# Patient Record
Sex: FEMALE | Race: WHITE | NOT HISPANIC OR LATINO | URBAN - METROPOLITAN AREA
[De-identification: names, ages, dates, MRNs, and addresses within clinical notes are randomized per-mention and may not be internally consistent; named-entity substitution may affect disease eponyms.]

---

## 2018-07-13 LAB
BASOPHILS # BLD AUTO: 0 X10E3/UL (ref 0–0.2)
BASOPHILS NFR BLD AUTO: 0 %
EOSINOPHIL # BLD AUTO: 0.3 X10E3/UL (ref 0–0.4)
EOSINOPHIL NFR BLD AUTO: 3 %
ERYTHROCYTE [DISTWIDTH] IN BLOOD BY AUTOMATED COUNT: 15 % (ref 12.3–15.4)
HCT VFR BLD AUTO: 43 % (ref 34–46.6)
HGB BLD-MCNC: 13.9 G/DL (ref 11.1–15.9)
IMM GRANULOCYTES # BLD: 0 X10E3/UL (ref 0–0.1)
IMM GRANULOCYTES NFR BLD: 0 %
LABCORP AMBIG ABBREV: NORMAL
LABCORP AMBIG ABBREV: NORMAL
LYMPHOCYTES # BLD AUTO: 1.9 X10E3/UL (ref 0.7–3.1)
LYMPHOCYTES NFR BLD AUTO: 23 %
MCH RBC QN AUTO: 30.2 PG (ref 26.6–33)
MCHC RBC AUTO-ENTMCNC: 32.3 G/DL (ref 31.5–35.7)
MCV RBC AUTO: 93 FL (ref 79–97)
MONOCYTES # BLD AUTO: 0.7 X10E3/UL (ref 0.1–0.9)
MONOCYTES NFR BLD AUTO: 8 %
NEUTROPHILS # BLD AUTO: 5.2 X10E3/UL (ref 1.4–7)
NEUTROPHILS NFR BLD AUTO: 66 %
PLATELET # BLD AUTO: 354 X10E3/UL (ref 150–379)
RBC # BLD AUTO: 4.61 X10E6/UL (ref 3.77–5.28)
WBC # BLD AUTO: 8 X10E3/UL (ref 3.4–10.8)

## 2018-07-14 LAB
ALT SERPL-CCNC: 14 IU/L (ref 0–32)
AST SERPL-CCNC: 15 IU/L (ref 0–40)
BUN SERPL-MCNC: 24 MG/DL (ref 8–27)
BUN/CREAT SERPL: 24 (ref 12–28)
CALCIUM SERPL-MCNC: 9.3 MG/DL (ref 8.7–10.3)
CHLORIDE SERPL-SCNC: 104 MMOL/L (ref 96–106)
CHOLEST SERPL-MCNC: 185 MG/DL (ref 100–199)
CO2 SERPL-SCNC: 24 MMOL/L (ref 20–29)
CREAT SERPL-MCNC: 1.02 MG/DL (ref 0.57–1)
GLUCOSE SERPL-MCNC: 84 MG/DL (ref 65–99)
HDLC SERPL-MCNC: 55 MG/DL
LAB CORP EGFR IF AFRICN AM: 57 ML/MIN/1.73
LAB CORP EGFR IF NONAFRICN AM: 49 ML/MIN/1.73
LDLC SERPL CALC-MCNC: 101 MG/DL (ref 0–99)
POTASSIUM SERPL-SCNC: 4.5 MMOL/L (ref 3.5–5.2)
SODIUM SERPL-SCNC: 144 MMOL/L (ref 134–144)
TRIGL SERPL-MCNC: 147 MG/DL (ref 0–149)
VLDLC SERPL CALC-MCNC: 29 MG/DL (ref 5–40)

## 2018-10-01 ENCOUNTER — APPOINTMENT (OUTPATIENT)
Dept: URBAN - METROPOLITAN AREA CLINIC 200 | Age: 84
Setting detail: DERMATOLOGY
End: 2018-10-01

## 2018-10-01 ENCOUNTER — RX ONLY (RX ONLY)
Age: 84
End: 2018-10-01

## 2018-10-01 DIAGNOSIS — D485 NEOPLASM OF UNCERTAIN BEHAVIOR OF SKIN: ICD-10-CM

## 2018-10-01 DIAGNOSIS — L57.8 OTHER SKIN CHANGES DUE TO CHRONIC EXPOSURE TO NONIONIZING RADIATION: ICD-10-CM

## 2018-10-01 DIAGNOSIS — L57.0 ACTINIC KERATOSIS: ICD-10-CM

## 2018-10-01 PROBLEM — I10 ESSENTIAL (PRIMARY) HYPERTENSION: Status: ACTIVE | Noted: 2018-10-01

## 2018-10-01 PROBLEM — D48.5 NEOPLASM OF UNCERTAIN BEHAVIOR OF SKIN: Status: ACTIVE | Noted: 2018-10-01

## 2018-10-01 PROCEDURE — 17000 DESTRUCT PREMALG LESION: CPT

## 2018-10-01 PROCEDURE — OTHER COUNSELING: OTHER

## 2018-10-01 PROCEDURE — OTHER MIPS QUALITY: OTHER

## 2018-10-01 PROCEDURE — 99213 OFFICE O/P EST LOW 20 MIN: CPT | Mod: 25

## 2018-10-01 PROCEDURE — 11100: CPT | Mod: 59

## 2018-10-01 PROCEDURE — OTHER LIQUID NITROGEN: OTHER

## 2018-10-01 PROCEDURE — OTHER BIOPSY BY SHAVE METHOD: OTHER

## 2018-10-01 RX ORDER — TRETINOIN 0.25 MG/G
CREAM TOPICAL
Qty: 1 | Refills: 11 | Status: ERX

## 2018-10-01 ASSESSMENT — LOCATION ZONE DERM
LOCATION ZONE: ARM
LOCATION ZONE: TRUNK

## 2018-10-01 ASSESSMENT — PAIN INTENSITY VAS: HOW INTENSE IS YOUR PAIN 0 BEING NO PAIN, 10 BEING THE MOST SEVERE PAIN POSSIBLE?: NO PAIN

## 2018-10-01 ASSESSMENT — LOCATION DETAILED DESCRIPTION DERM
LOCATION DETAILED: LEFT LATERAL SUPERIOR CHEST
LOCATION DETAILED: LEFT MEDIAL SUPERIOR CHEST
LOCATION DETAILED: LEFT DISTAL DORSAL FOREARM

## 2018-10-01 ASSESSMENT — LOCATION SIMPLE DESCRIPTION DERM
LOCATION SIMPLE: LEFT FOREARM
LOCATION SIMPLE: CHEST

## 2018-10-01 NOTE — PROCEDURE: BIOPSY BY SHAVE METHOD
Post-Care Instructions: I reviewed with the patient in detail post-care instructions. Patient is to keep the biopsy site dry overnight, and then apply bacitracin twice daily until healed. Patient may apply hydrogen peroxide soaks to remove any crusting.
Consent: Written consent was obtained and risks were reviewed including but not limited to scarring, infection, bleeding, scabbing, incomplete removal, nerve damage and allergy to anesthesia.
Detail Level: Detailed
Depth Of Biopsy: dermis
Anesthesia Volume In Cc (Will Not Render If 0): 0.5
Dressing: bandage
Anesthesia Type: 0.5% lidocaine without epinephrine
X Size Of Lesion In Cm: 0
Biopsy Method: Dermablade
Billing Type: Third-Party Bill
Size Of Lesion In Cm: 0.8
Destruction After The Procedure: No
Electrodesiccation Text: The wound bed was treated with electrodesiccation after the biopsy was performed.
Wound Care: Petrolatum
Type Of Destruction Used: Curettage
Silver Nitrate Text: The wound bed was treated with silver nitrate after the biopsy was performed.
Hemostasis: Drysol
Notification Instructions: Patient will be notified of biopsy results. However, patient instructed to call the office if not contacted within 2 weeks.
Cryotherapy Text: The wound bed was treated with cryotherapy after the biopsy was performed.
Electrodesiccation And Curettage Text: The wound bed was treated with electrodesiccation and curettage after the biopsy was performed.
Curettage Text: The wound bed was treated with curettage after the biopsy was performed.
Was A Bandage Applied: Yes
Biopsy Type: H and E

## 2019-04-12 ENCOUNTER — APPOINTMENT (OUTPATIENT)
Dept: LAB | Age: 84
End: 2019-04-12
Attending: INTERNAL MEDICINE
Payer: MEDICARE

## 2019-04-12 ENCOUNTER — TRANSCRIBE ORDERS (OUTPATIENT)
Dept: LAB | Age: 84
End: 2019-04-12

## 2019-04-12 DIAGNOSIS — E78.5 HYPERLIPIDEMIA: ICD-10-CM

## 2019-04-12 DIAGNOSIS — R06.09 OTHER FORMS OF DYSPNEA: ICD-10-CM

## 2019-04-12 DIAGNOSIS — R00.2 PALPITATIONS: ICD-10-CM

## 2019-04-12 DIAGNOSIS — I25.10 ATHEROSCLEROTIC HEART DISEASE OF NATIVE CORONARY ARTERY WITHOUT ANGINA PECTORIS: ICD-10-CM

## 2019-04-12 DIAGNOSIS — I25.10 ATHEROSCLEROTIC HEART DISEASE OF NATIVE CORONARY ARTERY WITHOUT ANGINA PECTORIS: Primary | ICD-10-CM

## 2019-04-12 LAB
ALBUMIN SERPL-MCNC: 3.5 G/DL (ref 3.4–5)
ALP SERPL-CCNC: 86 IU/L (ref 35–126)
ALT SERPL-CCNC: 17 IU/L (ref 11–54)
AST SERPL-CCNC: 18 IU/L (ref 15–41)
BILIRUB DIRECT SERPL-MCNC: 0.2 MG/DL
BILIRUB SERPL-MCNC: 1 MG/DL (ref 0.3–1.2)
CHOLEST SERPL-MCNC: 200 MG/DL
HDLC SERPL-MCNC: 49 MG/DL
HDLC SERPL: 4.1 {RATIO}
LDLC SERPL CALC-MCNC: 123 MG/DL
NONHDLC SERPL-MCNC: 151 MG/DL
PROT SERPL-MCNC: 5.8 G/DL (ref 6–8.2)
TRIGL SERPL-MCNC: 140 MG/DL (ref 30–149)

## 2019-04-12 PROCEDURE — 36415 COLL VENOUS BLD VENIPUNCTURE: CPT

## 2019-04-12 PROCEDURE — 80076 HEPATIC FUNCTION PANEL: CPT

## 2019-04-12 PROCEDURE — 80061 LIPID PANEL: CPT

## 2019-05-03 ENCOUNTER — OFFICE VISIT (OUTPATIENT)
Dept: PRIMARY CARE | Facility: CLINIC | Age: 84
End: 2019-05-03
Payer: MEDICARE

## 2019-05-03 VITALS
DIASTOLIC BLOOD PRESSURE: 62 MMHG | OXYGEN SATURATION: 98 % | TEMPERATURE: 98.4 F | HEIGHT: 65 IN | SYSTOLIC BLOOD PRESSURE: 128 MMHG | HEART RATE: 63 BPM | RESPIRATION RATE: 14 BRPM | WEIGHT: 172.8 LBS | BODY MASS INDEX: 28.79 KG/M2

## 2019-05-03 DIAGNOSIS — M17.12 PRIMARY OSTEOARTHRITIS OF LEFT KNEE: ICD-10-CM

## 2019-05-03 DIAGNOSIS — M17.0 TRICOMPARTMENT OSTEOARTHRITIS OF BOTH KNEES: Primary | ICD-10-CM

## 2019-05-03 DIAGNOSIS — M17.11 PRIMARY OSTEOARTHRITIS OF RIGHT KNEE: ICD-10-CM

## 2019-05-03 PROBLEM — M17.9 DJD (DEGENERATIVE JOINT DISEASE) OF KNEE: Status: ACTIVE | Noted: 2017-12-11

## 2019-05-03 PROCEDURE — 99213 OFFICE O/P EST LOW 20 MIN: CPT | Mod: 25 | Performed by: INTERNAL MEDICINE

## 2019-05-03 PROCEDURE — 20610 DRAIN/INJ JOINT/BURSA W/O US: CPT | Mod: 50 | Performed by: INTERNAL MEDICINE

## 2019-05-03 RX ORDER — METOPROLOL TARTRATE 50 MG/1
50 TABLET ORAL
COMMUNITY
End: 2020-01-02

## 2019-05-03 RX ORDER — ATORVASTATIN CALCIUM 10 MG/1
10 TABLET, FILM COATED ORAL
Refills: 3 | COMMUNITY
Start: 2019-03-26 | End: 2020-01-24

## 2019-05-03 RX ORDER — METHYLPREDNISOLONE ACETATE 40 MG/ML
40 INJECTION, SUSPENSION INTRA-ARTICULAR; INTRALESIONAL; INTRAMUSCULAR; SOFT TISSUE ONCE
Status: COMPLETED | OUTPATIENT
Start: 2019-05-03 | End: 2019-05-03

## 2019-05-03 RX ORDER — ASPIRIN 81 MG/1
81 TABLET ORAL DAILY
COMMUNITY
Start: 2017-12-22 | End: 2020-01-24

## 2019-05-03 RX ADMIN — METHYLPREDNISOLONE ACETATE 40 MG: 40 INJECTION, SUSPENSION INTRA-ARTICULAR; INTRALESIONAL; INTRAMUSCULAR; SOFT TISSUE at 16:26

## 2019-05-03 RX ADMIN — METHYLPREDNISOLONE ACETATE 40 MG: 40 INJECTION, SUSPENSION INTRA-ARTICULAR; INTRALESIONAL; INTRAMUSCULAR; SOFT TISSUE at 16:25

## 2019-05-03 ASSESSMENT — ENCOUNTER SYMPTOMS
MYALGIAS: 0
ARTHRALGIAS: 1
FEVER: 0
JOINT SWELLING: 0
NUMBNESS: 0

## 2019-05-03 NOTE — PROCEDURES
Joint Aspiration/Injection  Date/Time: 5/3/2019 4:23 PM  Performed by: YOGI POLANCO  Authorized by: YOGI POLANCO     Consent:     Consent obtained:  Verbal    Consent given by:  Patient  Location:     Location:  Knee    Knee:  R knee  Anesthesia (see MAR for exact dosages):     Anesthesia method:  Local infiltration    Local anesthetic:  Lidocaine 1% w/o epi  Procedure details:     Preparation: Patient was prepped and draped in usual sterile fashion      Needle gauge:  22 G    Ultrasound guidance: no      Approach:  Lateral    Steroid injected: yes      Specimen collected: no    Post-procedure details:     Dressing:  Adhesive bandage    Patient tolerance of procedure:  Tolerated well, no immediate complications

## 2019-05-03 NOTE — PROCEDURES
Joint Aspiration/Injection  Date/Time: 5/3/2019 4:20 PM  Performed by: YOGI POLANCO  Authorized by: YOGI POLANCO     Consent:     Consent obtained:  Verbal    Consent given by:  Patient    Alternatives discussed:  Alternative treatment  Location:     Location:  Knee    Knee:  L knee  Anesthesia (see MAR for exact dosages):     Anesthesia method:  Local infiltration    Local anesthetic:  Lidocaine 1% w/o epi  Procedure details:     Preparation: Patient was prepped and draped in usual sterile fashion      Needle gauge:  22 G    Ultrasound guidance: no      Approach:  Lateral    Steroid injected: yes      Specimen collected: no    Post-procedure details:     Dressing:  Adhesive bandage    Patient tolerance of procedure:  Tolerated well, no immediate complications

## 2019-05-03 NOTE — PROGRESS NOTES
Subjective      Patient ID: Gali Escalera is a 89 y.o. female.    HPI  Here for bilateral knee pain, left more than right.  History of ongoing degenerative arthritis of both knees, has had steroid injections in both in the past.  Is here to have steroid injection.  She saw ortho who told she needs knee replacements, but she is not interested in this.     + hyperlipidemia. controlled on Atorvastatin.  +leg edema..uses HCTZ rarely.  +palpitations and non critical occlusion at proximal LAD,, ejection fraction 55% on cath 2014; on Ranexa and  Metoprolol. . Has had no chest pain, palpitations, or syncope. Dr. Yeboah,, last appt Sept 2017. condition stable.  + degenerative arthritis, in both knees...  +fracture left hip July 2017, due to a fall. Had left hip pinning , then replacement Dec 2017.     The following have been reviewed and updated as appropriate in this visit:       Review of Systems   Constitutional: Negative for fever.   Cardiovascular: Negative for leg swelling.   Musculoskeletal: Positive for arthralgias and gait problem. Negative for joint swelling and myalgias.        See history of present illness   Neurological: Negative for numbness.     Current Outpatient Prescriptions   Medication Sig Dispense Refill   • aspirin 81 mg enteric coated tablet Take 81 mg by mouth 2 times daily.     • atorvastatin (LIPITOR) 10 mg tablet Take 10 mg by mouth once daily.  3   • metoprolol tartrate (LOPRESSOR) 50 mg tablet 50 mg.       No current facility-administered medications for this visit.      No past medical history on file.  No family history on file.  Allergies   Allergen Reactions   • No Known Allergies      No past surgical history on file.  Social History     Social History   • Marital status:      Spouse name: N/A   • Number of children: N/A   • Years of education: N/A     Social History Main Topics   • Smoking status: Not on file   • Smokeless tobacco: Not on file   • Alcohol use Not on file   • Drug  "use: Unknown   • Sexual activity: Not on file     Other Topics Concern   • Not on file     Social History Narrative   • No narrative on file       Objective     Vitals:   Vitals:    05/03/19 1043   BP: 128/62   BP Location: Left upper arm   Patient Position: Sitting   Pulse: 63   Resp: 14   Temp: 36.9 °C (98.4 °F)   SpO2: 98%   Weight: 78.4 kg (172 lb 12.8 oz)   Height: 1.651 m (5' 5\")       Physical Exam   Constitutional: She appears well-nourished. No distress.   Musculoskeletal: She exhibits no edema.   Significant degenerative changes of both knees, crepitus with flexion, cannot fully flex either knee secondary to arthritis.  No joint effusion.   Neurological: No sensory deficit.   Psychiatric: She has a normal mood and affect.   Nursing note and vitals reviewed.      Labs  Lab Results   Component Value Date    WBC 8.0 07/13/2018    HGB 13.9 07/13/2018    HCT 43.0 07/13/2018     07/13/2018    CHOL 200 04/12/2019    TRIG 140 04/12/2019    HDL 49 (L) 04/12/2019    LDLCALC 123 (H) 04/12/2019    ALT 17 04/12/2019    AST 18 04/12/2019     07/13/2018    K 4.5 07/13/2018     07/13/2018    CREATININE 1.02 (H) 07/13/2018    BUN 24 07/13/2018    CO2 24 07/13/2018    TSH 3.45 07/18/2016       Assessment/Plan   Problem List Items Addressed This Visit     None      Visit Diagnoses     Tricompartment osteoarthritis of both knees    -  Primary        Under sterile conditions injected both knees with 1 cc plain lidocaine and 40 mg of methylprednisolone.  Patient tolerated well, no complications.  She will follow up as needed.  Timmy Archibald MD  5/3/2019    "

## 2019-10-04 ENCOUNTER — OFFICE VISIT (OUTPATIENT)
Dept: PRIMARY CARE | Facility: CLINIC | Age: 84
End: 2019-10-04
Payer: MEDICARE

## 2019-10-04 VITALS
DIASTOLIC BLOOD PRESSURE: 70 MMHG | RESPIRATION RATE: 18 BRPM | WEIGHT: 174.8 LBS | HEART RATE: 59 BPM | BODY MASS INDEX: 29.09 KG/M2 | TEMPERATURE: 98.7 F | OXYGEN SATURATION: 97 % | SYSTOLIC BLOOD PRESSURE: 130 MMHG

## 2019-10-04 DIAGNOSIS — Z23 INFLUENZA VACCINE NEEDED: ICD-10-CM

## 2019-10-04 DIAGNOSIS — S83.92XA SPRAIN OF LEFT LOWER LEG, INITIAL ENCOUNTER: Primary | ICD-10-CM

## 2019-10-04 PROCEDURE — G0008 ADMIN INFLUENZA VIRUS VAC: HCPCS | Performed by: INTERNAL MEDICINE

## 2019-10-04 PROCEDURE — 90653 IIV ADJUVANT VACCINE IM: CPT | Performed by: INTERNAL MEDICINE

## 2019-10-04 PROCEDURE — 99213 OFFICE O/P EST LOW 20 MIN: CPT | Mod: 25 | Performed by: INTERNAL MEDICINE

## 2019-10-04 RX ORDER — HYDROCHLOROTHIAZIDE 12.5 MG/1
12.5 TABLET ORAL DAILY PRN
Refills: 3 | COMMUNITY
Start: 2019-07-28 | End: 2020-01-02

## 2019-10-04 ASSESSMENT — ENCOUNTER SYMPTOMS
APPETITE CHANGE: 0
ACTIVITY CHANGE: 0
ARTHRALGIAS: 1
NUMBNESS: 0
SHORTNESS OF BREATH: 1
WEAKNESS: 0

## 2019-10-04 NOTE — PROGRESS NOTES
Subjective      Patient ID: Gali Escalera is a 89 y.o. female.    HPI  Here for discomfort in rt lower leg for a year or two. . No injury or swelling.  Pain on and off, not consistently with ambulation.   Had Rt hip fx Jan 2018. Says rt lower leg began hurting after this.   Uses topical creams and advil with some temporary improvement.     Saw cardiologist this week. He ordered PFT's as pt has chronic LIMON. Cardiac status stable.     The following have been reviewed and updated as appropriate in this visit:       Review of Systems   Constitutional: Negative for activity change and appetite change.   Respiratory: Positive for shortness of breath (chronic, limon).    Musculoskeletal: Positive for arthralgias and gait problem.        See hpi   Neurological: Negative for weakness and numbness.     Current Outpatient Prescriptions   Medication Sig Dispense Refill   • aspirin 81 mg enteric coated tablet Take 81 mg by mouth 2 times daily.     • atorvastatin (LIPITOR) 10 mg tablet Take 10 mg by mouth once daily.  3   • hydrochlorothiazide (HYDRODIURIL) 12.5 mg tablet Take 12.5 mg by mouth daily as needed.  3   • metoprolol tartrate (LOPRESSOR) 50 mg tablet 50 mg.       No current facility-administered medications for this visit.      No past medical history on file.  No family history on file.  Allergies   Allergen Reactions   • No Known Allergies      No past surgical history on file.  Social History     Social History   • Marital status:      Spouse name: N/A   • Number of children: N/A   • Years of education: N/A     Social History Main Topics   • Smoking status: Never Smoker   • Smokeless tobacco: Never Used   • Alcohol use None   • Drug use: Unknown   • Sexual activity: Not Asked     Other Topics Concern   • None     Social History Narrative   • None       Objective     Vitals:   Vitals:    10/04/19 1217   BP: 130/70   Pulse: (!) 59   Resp: 18   Temp: 37.1 °C (98.7 °F)   SpO2: 97%   Weight: 79.3 kg (174 lb 12.8  oz)       Physical Exam   Constitutional: She appears well-nourished. No distress.   Cardiovascular:   Pulses:       Dorsalis pedis pulses are 2+ on the right side, and 2+ on the left side.   Musculoskeletal:   Right lower leg appears normal.  There is no swelling, ecchymosis, erythema.  She points to the gastrocnemius muscle where her discomfort is located.  No palpable abnormality of muscle.  Normal dorsiflexion and plantarflexion of foot  without discomfort in calf.  No tenderness along Achilles.  Had patient walk, no discomfort with ambulation.   Nursing note and vitals reviewed.      Labs  Lab Results   Component Value Date    WBC 8.0 07/13/2018    HGB 13.9 07/13/2018    HCT 43.0 07/13/2018     07/13/2018    CHOL 200 04/12/2019    TRIG 140 04/12/2019    HDL 49 (L) 04/12/2019    LDLCALC 123 (H) 04/12/2019    ALT 17 04/12/2019    AST 18 04/12/2019     07/13/2018    K 4.5 07/13/2018     07/13/2018    CREATININE 1.02 (H) 07/13/2018    BUN 24 07/13/2018    CO2 24 07/13/2018    TSH 3.45 07/18/2016       Assessment/Plan   Problem List Items Addressed This Visit     None      Visit Diagnoses     Sprain of left lower leg, initial encounter    -  Primary    Relevant Orders    Ambulatory referral to Physical Therapy        Exam is not revealing,but likely is muscular. Send to PT.   Timmy Archibald MD  10/4/2019

## 2019-10-23 ENCOUNTER — TRANSCRIBE ORDERS (OUTPATIENT)
Dept: SCHEDULING | Age: 84
End: 2019-10-23

## 2019-10-25 ENCOUNTER — RX ONLY (RX ONLY)
Age: 85
End: 2019-10-25

## 2019-10-25 ENCOUNTER — APPOINTMENT (OUTPATIENT)
Dept: URBAN - METROPOLITAN AREA CLINIC 200 | Age: 85
Setting detail: DERMATOLOGY
End: 2019-10-29

## 2019-10-25 DIAGNOSIS — L57.8 OTHER SKIN CHANGES DUE TO CHRONIC EXPOSURE TO NONIONIZING RADIATION: ICD-10-CM

## 2019-10-25 DIAGNOSIS — L57.0 ACTINIC KERATOSIS: ICD-10-CM

## 2019-10-25 PROCEDURE — 17003 DESTRUCT PREMALG LES 2-14: CPT

## 2019-10-25 PROCEDURE — OTHER MIPS QUALITY: OTHER

## 2019-10-25 PROCEDURE — OTHER LIQUID NITROGEN: OTHER

## 2019-10-25 PROCEDURE — 17000 DESTRUCT PREMALG LESION: CPT

## 2019-10-25 PROCEDURE — OTHER COUNSELING: OTHER

## 2019-10-25 PROCEDURE — 99213 OFFICE O/P EST LOW 20 MIN: CPT | Mod: 25

## 2019-10-25 RX ORDER — TRETINOIN 0.25 MG/G
CREAM TOPICAL
Qty: 1 | Refills: 11 | Status: ERX | COMMUNITY
Start: 2019-10-25

## 2019-10-25 ASSESSMENT — LOCATION SIMPLE DESCRIPTION DERM
LOCATION SIMPLE: RIGHT CLAVICULAR SKIN
LOCATION SIMPLE: RIGHT ANTERIOR NECK
LOCATION SIMPLE: LEFT ANTERIOR NECK
LOCATION SIMPLE: CHEST
LOCATION SIMPLE: RIGHT SHOULDER

## 2019-10-25 ASSESSMENT — LOCATION DETAILED DESCRIPTION DERM
LOCATION DETAILED: LEFT CLAVICULAR NECK
LOCATION DETAILED: RIGHT ANTERIOR SHOULDER
LOCATION DETAILED: LEFT MEDIAL SUPERIOR CHEST
LOCATION DETAILED: RIGHT CLAVICULAR NECK
LOCATION DETAILED: RIGHT CLAVICULAR SKIN

## 2019-10-25 ASSESSMENT — LOCATION ZONE DERM
LOCATION ZONE: TRUNK
LOCATION ZONE: NECK
LOCATION ZONE: ARM

## 2019-10-25 NOTE — PROCEDURE: MIPS QUALITY
Quality 110: Preventive Care And Screening: Influenza Immunization: Influenza Immunization previously received during influenza season
Additional Notes: Shingles SHINGRIX vaccine not received due to it being on back order.
Quality 474: Zoster Vaccination Status: Shingrix vaccine was not administered for reasons documented by clinician (e.g. patient administered vaccine other than Shingrix, patient allergy or other medical reasons, patient declined or other patient reasons, vaccine not available or other system reasons)
Detail Level: Detailed

## 2019-10-25 NOTE — PROCEDURE: LIQUID NITROGEN
Render Note In Bullet Format When Appropriate: No
Number Of Freeze-Thaw Cycles: 1 freeze-thaw cycle
Detail Level: Detailed
Duration Of Freeze Thaw-Cycle (Seconds): 2
Consent: The patient's consent was obtained including but not limited to risks of crusting, scabbing, blistering, scarring, darker or lighter pigmentary change, recurrence, incomplete removal and infection.
Post-Care Instructions: I reviewed with the patient in detail post-care instructions. Patient is to wear sunprotection, and avoid picking at any of the treated lesions. Pt may apply Vaseline to crusted or scabbing areas.

## 2019-11-22 ENCOUNTER — HOSPITAL ENCOUNTER (OUTPATIENT)
Facility: CLINIC | Age: 84
Discharge: ANOTHER HOSPITAL | End: 2019-11-22
Attending: EMERGENCY MEDICINE
Payer: MEDICARE

## 2019-11-22 VITALS
HEART RATE: 89 BPM | HEIGHT: 65 IN | DIASTOLIC BLOOD PRESSURE: 80 MMHG | SYSTOLIC BLOOD PRESSURE: 138 MMHG | OXYGEN SATURATION: 96 % | TEMPERATURE: 98.7 F | BODY MASS INDEX: 27.49 KG/M2 | WEIGHT: 165 LBS

## 2019-11-22 DIAGNOSIS — R11.10 NON-INTRACTABLE VOMITING, PRESENCE OF NAUSEA NOT SPECIFIED, UNSPECIFIED VOMITING TYPE: Primary | ICD-10-CM

## 2019-11-22 DIAGNOSIS — R19.7 DIARRHEA, UNSPECIFIED TYPE: ICD-10-CM

## 2019-11-22 LAB
RAPID INFLUENZA A AGN: NEGATIVE
RAPID INFLUENZA B AGN: NEGATIVE

## 2019-11-22 PROCEDURE — 87804 INFLUENZA ASSAY W/OPTIC: CPT | Mod: QW | Performed by: EMERGENCY MEDICINE

## 2019-11-22 PROCEDURE — 99213 OFFICE O/P EST LOW 20 MIN: CPT | Performed by: EMERGENCY MEDICINE

## 2019-11-22 RX ORDER — TRETINOIN 0.25 MG/G
CREAM TOPICAL
Refills: 11 | COMMUNITY
Start: 2019-10-29 | End: 2023-05-04 | Stop reason: ALTCHOICE

## 2019-11-22 ASSESSMENT — ENCOUNTER SYMPTOMS
COUGH: 0
DIARRHEA: 1
FATIGUE: 1
ABDOMINAL PAIN: 1
APPETITE CHANGE: 1
BRUISES/BLEEDS EASILY: 0
CHILLS: 1
ADENOPATHY: 0
VOMITING: 1
TROUBLE SWALLOWING: 0
FEVER: 0
SHORTNESS OF BREATH: 0
RHINORRHEA: 0
VOICE CHANGE: 0
NAUSEA: 1

## 2019-11-22 NOTE — ED PROVIDER NOTES
History  Chief Complaint   Patient presents with   • Generalized Body Aches   • Diarrhea (5am)     Pt started with chills, myalgias last pm  Started with diarrhea 500am, nonbloody  Anorexia/abd pain  Nasal congestion started this am    NO fevers  NO cough      History provided by:  Patient   used: No        No past medical history on file.    No past surgical history on file.    No family history on file.    Social History     Tobacco Use   • Smoking status: Never Smoker   • Smokeless tobacco: Never Used   Substance Use Topics   • Alcohol use: Not on file   • Drug use: Not on file       Review of Systems   Constitutional: Positive for appetite change, chills and fatigue. Negative for fever.   HENT: Negative for nosebleeds, rhinorrhea, trouble swallowing and voice change.    Respiratory: Negative for cough and shortness of breath.    Cardiovascular: Negative for chest pain.   Gastrointestinal: Positive for abdominal pain, diarrhea, nausea and vomiting.   Allergic/Immunologic: Negative for immunocompromised state.   Hematological: Negative for adenopathy. Does not bruise/bleed easily.       Physical Exam  ED Triage Vitals [11/22/19 1150]   Temp Heart Rate Resp BP SpO2   37.1 °C (98.7 °F) 89 -- 138/80 96 %      Temp Source Heart Rate Source Patient Position BP Location FiO2 (%) (Set)   Oral -- Sitting Left upper arm --       Physical Exam   Constitutional: She is oriented to person, place, and time. She appears well-developed and well-nourished.   HENT:   Head: Normocephalic and atraumatic.   Right Ear: External ear normal.   Left Ear: External ear normal.   Nose: Nose normal.   Mouth/Throat: Oropharynx is clear and moist.   Eyes: Pupils are equal, round, and reactive to light. Conjunctivae and EOM are normal.   Neck: Normal range of motion. Neck supple.   Cardiovascular: Normal rate, regular rhythm, normal heart sounds and intact distal pulses.   Pulmonary/Chest: Effort normal and breath sounds  normal.   Abdominal: Soft. Bowel sounds are normal. She exhibits no mass. There is tenderness. There is no guarding.   Tenderness LLQ   Musculoskeletal: Normal range of motion.   Neurological: She is alert and oriented to person, place, and time.   Skin: Skin is warm and dry. Capillary refill takes less than 2 seconds.         Procedures  Procedures    UC Course  Clinical Impressions as of Nov 22 1214   Non-intractable vomiting, presence of nausea not specified, unspecified vomiting type   Diarrhea, unspecified type       MDM  Number of Diagnoses or Management Options  Diarrhea, unspecified type:   Non-intractable vomiting, presence of nausea not specified, unspecified vomiting type:   Diagnosis management comments: Pt has billious emesis in UC  Rec she be evaluated in ED- IVF, labs, antiemetics  Grandson taking her to [facility]- staff notified.                 Chen Troncoso,   11/22/19 5449

## 2019-11-27 ENCOUNTER — TELEPHONE (OUTPATIENT)
Dept: PRIMARY CARE | Facility: CLINIC | Age: 84
End: 2019-11-27

## 2019-11-27 NOTE — TELEPHONE ENCOUNTER
Eve from [facility] calling to let you know patient is being d/c and wanted to see if you will sign for her home care nursing and PT orders. Requesting confirmation call back at [phone]

## 2020-01-02 ENCOUNTER — OFFICE VISIT (OUTPATIENT)
Dept: PRIMARY CARE | Facility: CLINIC | Age: 85
End: 2020-01-02
Payer: MEDICARE

## 2020-01-02 ENCOUNTER — APPOINTMENT (OUTPATIENT)
Dept: LAB | Age: 85
End: 2020-01-02
Attending: INTERNAL MEDICINE
Payer: MEDICARE

## 2020-01-02 VITALS
RESPIRATION RATE: 16 BRPM | DIASTOLIC BLOOD PRESSURE: 62 MMHG | HEIGHT: 65 IN | HEART RATE: 70 BPM | WEIGHT: 164 LBS | TEMPERATURE: 98.5 F | SYSTOLIC BLOOD PRESSURE: 140 MMHG | BODY MASS INDEX: 27.32 KG/M2 | OXYGEN SATURATION: 97 %

## 2020-01-02 DIAGNOSIS — E78.2 MIXED HYPERLIPIDEMIA: ICD-10-CM

## 2020-01-02 DIAGNOSIS — R39.89 URINE DISCOLORATION: ICD-10-CM

## 2020-01-02 DIAGNOSIS — N20.0 RENAL CALCULI: ICD-10-CM

## 2020-01-02 DIAGNOSIS — I48.0 PAROXYSMAL A-FIB (CMS/HCC): ICD-10-CM

## 2020-01-02 DIAGNOSIS — I48.0 PAROXYSMAL A-FIB (CMS/HCC): Primary | ICD-10-CM

## 2020-01-02 LAB
ALT SERPL-CCNC: 18 IU/L (ref 11–54)
ANION GAP SERPL CALC-SCNC: 10 MEQ/L (ref 3–15)
AST SERPL-CCNC: 18 IU/L (ref 15–41)
BASOPHILS # BLD: 0.06 K/UL (ref 0.01–0.1)
BASOPHILS NFR BLD: 0.6 %
BUN SERPL-MCNC: 18 MG/DL (ref 8–20)
CALCIUM SERPL-MCNC: 9.4 MG/DL (ref 8.9–10.3)
CHLORIDE SERPL-SCNC: 108 MEQ/L (ref 98–109)
CHOLEST SERPL-MCNC: 170 MG/DL
CO2 SERPL-SCNC: 23 MEQ/L (ref 22–32)
CREAT SERPL-MCNC: 1.1 MG/DL
DIFFERENTIAL METHOD BLD: ABNORMAL
EOSINOPHIL # BLD: 0.33 K/UL (ref 0.04–0.36)
EOSINOPHIL NFR BLD: 3.5 %
ERYTHROCYTE [DISTWIDTH] IN BLOOD BY AUTOMATED COUNT: 14.9 % (ref 11.7–14.4)
GFR SERPL CREATININE-BSD FRML MDRD: 46.8 ML/MIN/1.73M*2
GLUCOSE SERPL-MCNC: 83 MG/DL (ref 70–99)
HCT VFR BLDCO AUTO: 45.2 %
HDLC SERPL-MCNC: 40 MG/DL
HDLC SERPL: 4.3 {RATIO}
HGB BLD-MCNC: 13.9 G/DL (ref 11.8–15.7)
IMM GRANULOCYTES # BLD AUTO: 0.04 K/UL (ref 0–0.08)
IMM GRANULOCYTES NFR BLD AUTO: 0.4 %
LDLC SERPL CALC-MCNC: 96 MG/DL
LYMPHOCYTES # BLD: 1.9 K/UL (ref 1.2–3.5)
LYMPHOCYTES NFR BLD: 20.1 %
MCH RBC QN AUTO: 29.3 PG (ref 28–33.2)
MCHC RBC AUTO-ENTMCNC: 30.8 G/DL (ref 32.2–35.5)
MCV RBC AUTO: 95.2 FL (ref 83–98)
MONOCYTES # BLD: 0.97 K/UL (ref 0.28–0.8)
MONOCYTES NFR BLD: 10.3 %
NEUTROPHILS # BLD: 6.15 K/UL (ref 1.7–7)
NEUTS SEG NFR BLD: 65.1 %
NONHDLC SERPL-MCNC: 130 MG/DL
NRBC BLD-RTO: 0 %
PDW BLD AUTO: 11.4 FL (ref 9.4–12.3)
PLATELET # BLD AUTO: 439 K/UL
POTASSIUM SERPL-SCNC: 4.5 MEQ/L (ref 3.6–5.1)
RBC # BLD AUTO: 4.75 M/UL (ref 3.93–5.22)
SODIUM SERPL-SCNC: 141 MEQ/L (ref 136–144)
TRIGL SERPL-MCNC: 170 MG/DL (ref 30–149)
WBC # BLD AUTO: 9.45 K/UL

## 2020-01-02 PROCEDURE — 99214 OFFICE O/P EST MOD 30 MIN: CPT | Performed by: INTERNAL MEDICINE

## 2020-01-02 PROCEDURE — 84460 ALANINE AMINO (ALT) (SGPT): CPT

## 2020-01-02 PROCEDURE — 85025 COMPLETE CBC W/AUTO DIFF WBC: CPT

## 2020-01-02 PROCEDURE — 80048 BASIC METABOLIC PNL TOTAL CA: CPT

## 2020-01-02 PROCEDURE — 80061 LIPID PANEL: CPT

## 2020-01-02 PROCEDURE — 84450 TRANSFERASE (AST) (SGOT): CPT

## 2020-01-02 PROCEDURE — 36415 COLL VENOUS BLD VENIPUNCTURE: CPT

## 2020-01-02 RX ORDER — FAMOTIDINE 20 MG/1
20 TABLET, FILM COATED ORAL DAILY
COMMUNITY
End: 2020-01-02

## 2020-01-02 RX ORDER — DILTIAZEM HYDROCHLORIDE 300 MG/1
300 CAPSULE, COATED, EXTENDED RELEASE ORAL DAILY
COMMUNITY
End: 2020-01-02 | Stop reason: SDUPTHER

## 2020-01-02 RX ORDER — DILTIAZEM HYDROCHLORIDE 300 MG/1
300 CAPSULE, COATED, EXTENDED RELEASE ORAL DAILY
Qty: 90 CAPSULE | Refills: 1 | Status: SHIPPED | OUTPATIENT
Start: 2020-01-02 | End: 2020-01-24

## 2020-01-02 RX ORDER — METOPROLOL SUCCINATE 100 MG/1
100 TABLET, EXTENDED RELEASE ORAL EVERY 12 HOURS
COMMUNITY
End: 2023-05-12

## 2020-01-02 ASSESSMENT — ENCOUNTER SYMPTOMS
SLEEP DISTURBANCE: 0
DIZZINESS: 0
NERVOUS/ANXIOUS: 0
CONSTIPATION: 0
HEADACHES: 0
ARTHRALGIAS: 1
WEAKNESS: 0
ACTIVITY CHANGE: 0
COUGH: 0
TROUBLE SWALLOWING: 0
PALPITATIONS: 0
APPETITE CHANGE: 0
DYSPHORIC MOOD: 0
DIARRHEA: 0
SHORTNESS OF BREATH: 0
HEMATURIA: 0
FATIGUE: 1
TREMORS: 0
LIGHT-HEADEDNESS: 0
ABDOMINAL PAIN: 0
DYSURIA: 0
BLOOD IN STOOL: 0

## 2020-01-02 NOTE — PATIENT INSTRUCTIONS
You had a intestinal virus and a urine infection that caused dehydration and the stress of all this caused the heart to be irregular, atrial fibrillation.   You are on Diltiazem and Metoprolol to control the heart rate.   You are on Eliquis (Apixiban) to prevent stroke when you go into atrial fibrillation.   Take Eliquis 2 x a day.     Due to see cardiologist.

## 2020-01-02 NOTE — PROGRESS NOTES
"Subjective      Patient ID: Gali Escalera is a 89 y.o. female.    HPI     Here for check up.   Seen in ER Nov'19 for UTI, diarrhea and nausea.  Admitted [facility] Nov 22 to Dec 3.   Diagnosed with viral gastroenteritis and dehydration.   Also found to have paroxysmal atrial fibrillation.  Placed on Eliquis.  Echo with ejection fraction 60 to 65%, mild to moderate mitral regurg, mild aortic regurg, moderate tricuspid regurg, mild pulmonic regurg.  Evaluated by cardiology.  Ultrasound of kidneys showed staghorn calculus with mild left hydronephrosis.  Discharged on apixaban 5 mg twice daily, aspirin 81 mg, atorvastatin 10 mg, diltiazem 300 mg, famotidine 20 mg, metoprolol 100 mg twice daily, oxycodone on 5 mg as needed.    Had PT at home, discharged. Ambulating fine now.   Urine is a little dark, no dysuria,  No chest pain, palpitations, syncope.   Has mild swelling in ankles.   Says \"mind is a little foggy.\"  No falls, is eating fine. Is sleeping fine.   Lives alone. Has a son in [place]. Wears alert button.     + hyperlipidemia. controlled on Atorvastatin.  +leg edema.  +palpitations and non critical occlusion at proximal LAD,, ejection fraction 55% on cath 2014; on Ranexa and  Metoprolol. . Has had no chest pain, palpitations, or syncope. Dr. Yeboah  + degenerative arthritis, in both knees...  +fracture left hip July 2017, due to a fall. Had left hip pinning , then replacement Dec 2017.     The following have been reviewed and updated as appropriate in this visit:  Tobacco  Allergies  Meds  Problems       Review of Systems   Constitutional: Positive for fatigue (mild ). Negative for activity change and appetite change.   HENT: Negative for congestion and trouble swallowing.    Eyes: Negative for visual disturbance.   Respiratory: Negative for cough and shortness of breath.    Cardiovascular: Negative for chest pain, palpitations and leg swelling.   Gastrointestinal: Negative for abdominal pain, blood in stool, " constipation and diarrhea.   Endocrine: Negative for cold intolerance and heat intolerance.   Genitourinary: Negative for dysuria, hematuria and pelvic pain.   Musculoskeletal: Positive for arthralgias. Negative for gait problem.   Neurological: Negative for dizziness, tremors, syncope, weakness, light-headedness and headaches.   Psychiatric/Behavioral: Negative for dysphoric mood and sleep disturbance. The patient is not nervous/anxious.      Current Outpatient Medications   Medication Sig Dispense Refill   • apixaban (ELIQUIS) 5 mg tablet Take 5 mg by mouth 2 (two) times a day.     • aspirin 81 mg enteric coated tablet Take 81 mg by mouth daily.      • dilTIAZem CD (CARDIZEM CD) 300 mg 24 hr capsule Take 1 capsule (300 mg total) by mouth daily. 90 capsule 1   • metoprolol succinate XL (TOPROL-XL) 100 mg 24 hr tablet Take 100 mg by mouth daily.     • tretinoin (RETIN-A) 0.025 % cream APPLY TOPICALLY TO FACE AT BEDTIME  11   • atorvastatin (LIPITOR) 10 mg tablet Take 10 mg by mouth once daily.  3     No current facility-administered medications for this visit.      History reviewed. No pertinent past medical history.  History reviewed. No pertinent family history.  Allergies   Allergen Reactions   • No Known Allergies      History reviewed. No pertinent surgical history.  Social History     Socioeconomic History   • Marital status:      Spouse name: None   • Number of children: None   • Years of education: None   • Highest education level: None   Occupational History   • None   Social Needs   • Financial resource strain: None   • Food insecurity:     Worry: None     Inability: None   • Transportation needs:     Medical: None     Non-medical: None   Tobacco Use   • Smoking status: Never Smoker   • Smokeless tobacco: Never Used   Substance and Sexual Activity   • Alcohol use: None   • Drug use: None   • Sexual activity: None   Lifestyle   • Physical activity:     Days per week: None     Minutes per session:  "None   • Stress: None   Relationships   • Social connections:     Talks on phone: None     Gets together: None     Attends L[employer] day service: None     Active member of club or organization: None     Attends meetings of clubs or organizations: None     Relationship status: None   • Intimate partner violence:     Fear of current or ex partner: None     Emotionally abused: None     Physically abused: None     Forced sexual activity: None   Other Topics Concern   • None   Social History Narrative   • None       Objective     Vitals:   Vitals:    01/02/20 0827   BP: 140/62   BP Location: Right upper arm   Patient Position: Sitting   Pulse: 70   Resp: 16   Temp: 36.9 °C (98.5 °F)   TempSrc: Oral   SpO2: 97%   Weight: 74.4 kg (164 lb)   Height: 1.651 m (5' 5\")       Physical Exam   Constitutional: She is oriented to person, place, and time.   Elderly female in no acute distress  She does not recall much of her hospitalization.  She claims no one really discussed her issues with her.  Also on reviewing discharge medication and the medication she is currently taking, it does not correlate.   HENT:   Head: Normocephalic and atraumatic.   Neck: No JVD present. No thyromegaly present.   Cardiovascular: Normal rate, regular rhythm and normal heart sounds.   No murmur heard.  Murmurs are not audible   Pulmonary/Chest: Effort normal and breath sounds normal. No respiratory distress.   Abdominal: Soft. Bowel sounds are normal. She exhibits no distension and no mass. There is no tenderness.   Musculoskeletal: She exhibits no edema.   Gait is slow but steady.   Lymphadenopathy:     She has no cervical adenopathy.   Neurological: She is alert and oriented to person, place, and time. No cranial nerve deficit. Coordination normal.   Skin:   Dry skin of lower extremities including feet.  No open wounds.   Psychiatric: She has a normal mood and affect. Her behavior is normal. Thought content normal.   Nursing note and vitals " reviewed.      Labs  Lab Results   Component Value Date    WBC 8.0 07/13/2018    HGB 13.9 07/13/2018    HCT 43.0 07/13/2018     07/13/2018    CHOL 200 04/12/2019    TRIG 140 04/12/2019    HDL 49 (L) 04/12/2019    LDLCALC 123 (H) 04/12/2019    ALT 17 04/12/2019    AST 18 04/12/2019     07/13/2018    K 4.5 07/13/2018    GLUCOSE 84 07/13/2018     07/13/2018    CREATININE 1.02 (H) 07/13/2018    BUN 24 07/13/2018    CO2 24 07/13/2018    TSH 3.45 07/18/2016    EGFR 49 (L) 07/13/2018    EGFR 57 (L) 07/13/2018       Assessment/Plan   Problem List Items Addressed This Visit        Circulatory    Paroxysmal A-fib (CMS/[facility]) - Primary    Relevant Medications    apixaban (ELIQUIS) 5 mg tablet    metoprolol succinate XL (TOPROL-XL) 100 mg 24 hr tablet    dilTIAZem CD (CARDIZEM CD) 300 mg 24 hr capsule    Other Relevant Orders    Basic metabolic panel    CBC and Differential       Genitourinary    Renal calculi    Relevant Orders    CBC and Differential       Endocrine/Metabolic    Mixed hyperlipidemia    Relevant Orders    Lipid panel    ALT    AST      Other Visit Diagnoses     Urine discoloration            Discussed hospitalization with patient.  Reviewed studies and all medications.  Paroxysmal atrial fibrillation controlled rate on metoprolol and diltiazem.  No evidence of cardiac decompensation.  On Eliquis 5 mg for stroke prevention.  Due to see cardiologist.  Large staghorn calculus on the left, asymptomatic at present.  Patient states urine looks a little dark so we will check here in the office.  Hyperlipidemia due for bloods.  Reviewed above with patient, all questions answered, to see cardiologist soon, return here in 2 months.  Timmy Archibald MD  1/2/2020

## 2020-01-24 ENCOUNTER — OFFICE VISIT (OUTPATIENT)
Dept: PRIMARY CARE | Facility: CLINIC | Age: 85
End: 2020-01-24
Payer: MEDICARE

## 2020-01-24 VITALS
BODY MASS INDEX: 27.32 KG/M2 | HEART RATE: 76 BPM | OXYGEN SATURATION: 97 % | HEIGHT: 65 IN | TEMPERATURE: 98.4 F | DIASTOLIC BLOOD PRESSURE: 78 MMHG | RESPIRATION RATE: 14 BRPM | WEIGHT: 164 LBS | SYSTOLIC BLOOD PRESSURE: 136 MMHG

## 2020-01-24 DIAGNOSIS — N20.0 STAGHORN CALCULUS: ICD-10-CM

## 2020-01-24 DIAGNOSIS — R31.9 HEMATURIA, UNSPECIFIED TYPE: Primary | ICD-10-CM

## 2020-01-24 DIAGNOSIS — R35.0 FREQUENCY OF URINATION: ICD-10-CM

## 2020-01-24 PROBLEM — R11.0 NAUSEA: Status: RESOLVED | Noted: 2019-11-22 | Resolved: 2020-01-24

## 2020-01-24 PROBLEM — D72.829 LEUKOCYTOSIS: Status: RESOLVED | Noted: 2019-11-22 | Resolved: 2020-01-24

## 2020-01-24 PROBLEM — N39.0 UTI (URINARY TRACT INFECTION): Status: ACTIVE | Noted: 2019-11-22

## 2020-01-24 PROBLEM — D72.829 LEUKOCYTOSIS: Status: ACTIVE | Noted: 2019-11-22

## 2020-01-24 PROBLEM — I48.91 A-FIB (CMS/HCC): Status: ACTIVE | Noted: 2019-11-22

## 2020-01-24 PROBLEM — N39.0 UTI (URINARY TRACT INFECTION): Status: RESOLVED | Noted: 2019-11-22 | Resolved: 2020-01-24

## 2020-01-24 PROBLEM — R11.0 NAUSEA: Status: ACTIVE | Noted: 2019-11-22

## 2020-01-24 PROBLEM — R19.7 DIARRHEA: Status: ACTIVE | Noted: 2019-11-22

## 2020-01-24 LAB
BILIRUBIN, POC: NEGATIVE
BLOOD URINE, POC: POSITIVE
CLARITY, POC: NORMAL
COLOR, POC: YELLOW
GLUCOSE URINE, POC: NEGATIVE
KETONES, POC: NEGATIVE
LEUKOCYTE EST, POC: NORMAL
NITRITE, POC: NORMAL
PH, POC: 6
PROTEIN, POC: NORMAL
SPECIFIC GRAVITY, POC: 1.02
UROBILINOGEN, POC: 0.2

## 2020-01-24 PROCEDURE — 81002 URINALYSIS NONAUTO W/O SCOPE: CPT | Performed by: INTERNAL MEDICINE

## 2020-01-24 PROCEDURE — 99213 OFFICE O/P EST LOW 20 MIN: CPT | Performed by: INTERNAL MEDICINE

## 2020-01-24 RX ORDER — DILTIAZEM HYDROCHLORIDE 300 MG/1
300 CAPSULE, EXTENDED RELEASE ORAL DAILY
COMMUNITY
Start: 2019-12-03 | End: 2021-05-19

## 2020-01-24 RX ORDER — ATORVASTATIN CALCIUM 10 MG/1
10 TABLET, FILM COATED ORAL
COMMUNITY
Start: 2017-07-25

## 2020-01-24 ASSESSMENT — ENCOUNTER SYMPTOMS
BLOOD IN STOOL: 0
CONSTIPATION: 0
DECREASED CONCENTRATION: 0
ARTHRALGIAS: 1
SLEEP DISTURBANCE: 0
LIGHT-HEADEDNESS: 0
DYSPHORIC MOOD: 0
SHORTNESS OF BREATH: 0
FEVER: 0
FATIGUE: 0
PALPITATIONS: 0
HEADACHES: 0
DIARRHEA: 0
ABDOMINAL PAIN: 0
DYSURIA: 0

## 2020-01-24 NOTE — PROGRESS NOTES
Subjective      Patient ID: Gali Escalera is a 89 y.o. female.    HPI   Patient is here to discuss cystoscopy.   Patient with hematuria, is scheduled for cystoscopy.  Pt says last month had passed several kidney stones.   Pt says urine is clear since passed stones. She does not want cysto as she says she would not have any surgical procedure or tx even if something was found.   Pt says she feels well overall for the first time in several months.     She ran out of Eliquis a few days ago. She called cardiology to have refilled.     +Staghorn calculus with mild left hydronephrosis November 2019.  +Paroxysmal atrial fibrillation November 2019 (converted on own) on Eliquis and Diltiazem..  Echo with ejection fraction 60 to 65%, mild to moderate mitral regurg, mild aortic regurg, moderate tricuspid regurg, mild pulmonic regurg.  Last cardio visit Dec 2019  + hyperlipidemia. controlled on Atorvastatin.  +leg edema.  + non critical occlusion at proximal LAD,, ejection fraction 55% on cath 2014; on Ranexa and  Metoprolol. . Has had no chest pain, palpitations, or syncope. Dr. Yeboah  +degenerative arthritis, in both knees...  +fracture left hip July 2017, due to a fall. Had left hip pinning , then replacement Dec 2017.     The following have been reviewed and updated as appropriate in this visit:  Allergies  Meds  Problems       Review of Systems   Constitutional: Negative for fatigue and fever.   Respiratory: Negative for shortness of breath.    Cardiovascular: Negative for chest pain and palpitations.   Gastrointestinal: Negative for abdominal pain, blood in stool, constipation and diarrhea.   Genitourinary: Negative for dysuria, pelvic pain and urgency.   Musculoskeletal: Positive for arthralgias.   Neurological: Negative for light-headedness and headaches.   Psychiatric/Behavioral: Negative for decreased concentration, dysphoric mood and sleep disturbance.     Current Outpatient Medications   Medication Sig  Dispense Refill   • apixaban (ELIQUIS) 5 mg tablet Take 5 mg by mouth 2 (two) times a day.     • atorvastatin (LIPITOR) 10 mg tablet Take 10 mg by mouth.     • diltiazem (TIAZAC) 300 mg 24 hr capsule Take 300 mg by mouth daily.     • metoprolol succinate XL (TOPROL-XL) 100 mg 24 hr tablet Take 100 mg by mouth daily.     • tretinoin (RETIN-A) 0.025 % cream APPLY TOPICALLY TO FACE AT BEDTIME  11     No current facility-administered medications for this visit.      Past Medical History:   Diagnosis Date   • Hyperlipidemia    • Mitral valve disorder      History reviewed. No pertinent family history.  Allergies   Allergen Reactions   • No Known Allergies      Past Surgical History:   Procedure Laterality Date   • TOTAL HIP ARTHROPLASTY       Social History     Socioeconomic History   • Marital status:      Spouse name: None   • Number of children: None   • Years of education: None   • Highest education level: None   Occupational History   • None   Social Needs   • Financial resource strain: None   • Food insecurity:     Worry: None     Inability: None   • Transportation needs:     Medical: None     Non-medical: None   Tobacco Use   • Smoking status: Never Smoker   • Smokeless tobacco: Never Used   Substance and Sexual Activity   • Alcohol use: None   • Drug use: None   • Sexual activity: None   Lifestyle   • Physical activity:     Days per week: None     Minutes per session: None   • Stress: None   Relationships   • Social connections:     Talks on phone: None     Gets together: None     Attends [employer] service: None     Active member of club or organization: None     Attends meetings of clubs or organizations: None     Relationship status: None   • Intimate partner violence:     Fear of current or ex partner: None     Emotionally abused: None     Physically abused: None     Forced sexual activity: None   Other Topics Concern   • None   Social History Narrative   • None       Objective     Vitals:   Vitals:     "01/24/20 0844   BP: 136/78   BP Location: Right upper arm   Patient Position: Sitting   Pulse: 76   Resp: 14   Temp: 36.9 °C (98.4 °F)   TempSrc: Oral   SpO2: 97%   Weight: 74.4 kg (164 lb)   Height: 1.651 m (5' 5\")       Physical Exam   Constitutional: She appears well-developed and well-nourished. No distress.   Appears well    Cardiovascular: Normal rate, regular rhythm and normal heart sounds.   Pulmonary/Chest: Effort normal and breath sounds normal.   Abdominal: There is no tenderness.   Genitourinary:   Genitourinary Comments: No CVA tenderness or suprapubic tenderness.    Psychiatric: She has a normal mood and affect.   Nursing note and vitals reviewed.      Labs  Lab Results   Component Value Date    WBC 9.45 01/02/2020    HGB 13.9 01/02/2020    HCT 45.2 (H) 01/02/2020     (H) 01/02/2020    CHOL 170 01/02/2020    TRIG 170 (H) 01/02/2020    HDL 40 (L) 01/02/2020    LDLCALC 96 01/02/2020    ALT 18 01/02/2020    AST 18 01/02/2020     01/02/2020    K 4.5 01/02/2020    GLUCOSE 83 01/02/2020     01/02/2020    CREATININE 1.1 01/02/2020    BUN 18 01/02/2020    CO2 23 01/02/2020    TSH 3.45 07/18/2016    EGFR 46.8 (L) 01/02/2020       Assessment/Plan   Problem List Items Addressed This Visit        Genitourinary    Staghorn calculus    Relevant Orders    POCT urinalysis dipstick (Completed)      Other Visit Diagnoses     Hematuria, unspecified type    -  Primary    Relevant Orders    POCT urinalysis dipstick (Completed)    Frequency of urination        Relevant Orders    POCT urinalysis dipstick (Completed)        Urine dip here with blood, visual inspection of urine is clear.   Pt is reluctant to have cysto as she is feeling very well after several months and is reluctant to have any further testing or procedures.   For now is reasonable to hold on cysto. She will call if sees blood in urine.     Timmy Archibald MD  1/24/2020    "

## 2020-01-24 NOTE — PATIENT INSTRUCTIONS
You have microscopic blood in urine but you are feeling well and for now can monitor.   If you see blood in urine, then must go through having cystoscopy.   Drink enough water daily to flush bladder.

## 2020-03-12 ENCOUNTER — OFFICE VISIT (OUTPATIENT)
Dept: PRIMARY CARE | Facility: CLINIC | Age: 85
End: 2020-03-12
Payer: MEDICARE

## 2020-03-12 VITALS
DIASTOLIC BLOOD PRESSURE: 82 MMHG | OXYGEN SATURATION: 98 % | BODY MASS INDEX: 27.32 KG/M2 | TEMPERATURE: 98.2 F | RESPIRATION RATE: 14 BRPM | HEIGHT: 65 IN | HEART RATE: 44 BPM | WEIGHT: 164 LBS | SYSTOLIC BLOOD PRESSURE: 126 MMHG

## 2020-03-12 DIAGNOSIS — I73.9 RIGHT LEG CLAUDICATION (CMS/HCC): ICD-10-CM

## 2020-03-12 DIAGNOSIS — R09.89 DECREASED PULSES IN FEET: Primary | ICD-10-CM

## 2020-03-12 PROCEDURE — 99214 OFFICE O/P EST MOD 30 MIN: CPT | Performed by: INTERNAL MEDICINE

## 2020-03-12 ASSESSMENT — ENCOUNTER SYMPTOMS
NAUSEA: 0
HEMATURIA: 0
SHORTNESS OF BREATH: 0
DIARRHEA: 0
FEVER: 0
DIAPHORESIS: 0
ARTHRALGIAS: 1
COUGH: 0
SLEEP DISTURBANCE: 0
LIGHT-HEADEDNESS: 0
SPEECH DIFFICULTY: 0
FATIGUE: 1
ABDOMINAL PAIN: 0
JOINT SWELLING: 0
CHILLS: 0
DYSURIA: 0
MYALGIAS: 1
PALPITATIONS: 0
HEADACHES: 0
BRUISES/BLEEDS EASILY: 1
NERVOUS/ANXIOUS: 0
DECREASED CONCENTRATION: 0

## 2020-03-12 NOTE — PATIENT INSTRUCTIONS
You have an arterial problem in the legs.   Right calf pain is likely Claudication.   To have vascular tests.

## 2020-03-12 NOTE — PROGRESS NOTES
Subjective      Patient ID: Gali Escalera is a 89 y.o. female.    HPI  Here for discoloration of toes, mostly on right .   Sees podiatrist who treated an ingrown toenail on rt 3rd toe. Gave antibx and completed it two weeks ago.  Pt thinks her rt foot is infected as it is very red. No fever or foot swelling. Is not painful.     Has pain on and off in rt calf when walks for a year. She plans to go to PT.     +Staghorn calculus with mild left hydronephrosis November 2019.  +Paroxysmal atrial fibrillation November 2019 , hospitalized (converted on own) on Eliquis and Diltiazem..  Echo with ejection fraction 60 to 65%, mild to moderate mitral regurg, mild aortic regurg, moderate tricuspid regurg, mild pulmonic regurg.Last cardio visit Jan 2020  + hyperlipidemia. controlled on Atorvastatin.  +leg edema., mild since on diltiazem  + non critical occlusion at proximal LAD,, ejection fraction 55% on cath 2014; was on Ranexa and Metoprolol. .(ranexa discontinued 2019) Has had no chest pain, palpitations, or syncope. Dr. Yeboah cardio  +degenerative arthritis, in both knees...  +fracture left hip July 2017, due to a fall. Had left hip pinning , then replacement Dec 2017.     The following have been reviewed and updated as appropriate in this visit:  Allergies  Meds  Problems       Review of Systems   Constitutional: Positive for fatigue (mild). Negative for chills, diaphoresis and fever.   Respiratory: Negative for cough and shortness of breath.    Cardiovascular: Positive for leg swelling (mild). Negative for chest pain and palpitations.   Gastrointestinal: Negative for abdominal pain, diarrhea and nausea.   Genitourinary: Negative for dysuria and hematuria.   Musculoskeletal: Positive for arthralgias and myalgias. Negative for joint swelling.   Neurological: Negative for speech difficulty, light-headedness and headaches.   Hematological: Bruises/bleeds easily.   Psychiatric/Behavioral: Negative for decreased  concentration and sleep disturbance. The patient is not nervous/anxious.      Current Outpatient Medications   Medication Sig Dispense Refill   • apixaban (ELIQUIS) 5 mg tablet Take 5 mg by mouth 2 (two) times a day.     • atorvastatin (LIPITOR) 10 mg tablet Take 10 mg by mouth.     • diltiazem (TIAZAC) 300 mg 24 hr capsule Take 300 mg by mouth daily.     • metoprolol succinate XL (TOPROL-XL) 100 mg 24 hr tablet Take 100 mg by mouth daily.     • tretinoin (RETIN-A) 0.025 % cream APPLY TOPICALLY TO FACE AT BEDTIME  11     No current facility-administered medications for this visit.      Past Medical History:   Diagnosis Date   • Hyperlipidemia    • Mitral valve disorder      History reviewed. No pertinent family history.  Allergies   Allergen Reactions   • No Known Allergies      Past Surgical History:   Procedure Laterality Date   • TOTAL HIP ARTHROPLASTY       Social History     Socioeconomic History   • Marital status:      Spouse name: None   • Number of children: None   • Years of education: None   • Highest education level: None   Occupational History   • None   Social Needs   • Financial resource strain: None   • Food insecurity:     Worry: None     Inability: None   • Transportation needs:     Medical: None     Non-medical: None   Tobacco Use   • Smoking status: Never Smoker   • Smokeless tobacco: Never Used   Substance and Sexual Activity   • Alcohol use: None   • Drug use: None   • Sexual activity: None   Lifestyle   • Physical activity:     Days per week: None     Minutes per session: None   • Stress: None   Relationships   • Social connections:     Talks on phone: None     Gets together: None     Attends Sabianism service: None     Active member of club or organization: None     Attends meetings of clubs or organizations: None     Relationship status: None   • Intimate partner violence:     Fear of current or ex partner: None     Emotionally abused: None     Physically abused: None     Forced  "sexual activity: None   Other Topics Concern   • None   Social History Narrative   • None       Objective     Vitals:   Vitals:    03/12/20 1545   BP: 126/82   BP Location: Left upper arm   Patient Position: Sitting   Pulse: (!) 44   Resp: 14   Temp: 36.8 °C (98.2 °F)   TempSrc: Oral   SpO2: 98%   Weight: 74.4 kg (164 lb)   Height: 1.651 m (5' 5\")       Physical Exam   Constitutional: She is oriented to person, place, and time. She appears well-nourished. No distress.   Pt appears well.    Cardiovascular:   Pulses:       Dorsalis pedis pulses are 0 on the right side, and 0 on the left side.        Posterior tibial pulses are 0 on the right side, and 0 on the left side.   Reddish discoloration of both feet, mostly on right.  With leg elevation redness resolves and is normal color.  Warm temperature to both feet.  No palpable pulses noted in feet.  No open wounds.  Mild varicosities bilaterally   Musculoskeletal:   No tenderness of right calf muscles.   Neurological: She is alert and oriented to person, place, and time.   Psychiatric: She has a normal mood and affect. Her behavior is normal.   Nursing note and vitals reviewed.      Labs  Lab Results   Component Value Date    WBC 9.45 01/02/2020    HGB 13.9 01/02/2020    HCT 45.2 (H) 01/02/2020     (H) 01/02/2020    CHOL 170 01/02/2020    TRIG 170 (H) 01/02/2020    HDL 40 (L) 01/02/2020    LDLCALC 96 01/02/2020    ALT 18 01/02/2020    AST 18 01/02/2020     01/02/2020    K 4.5 01/02/2020    GLUCOSE 83 01/02/2020     01/02/2020    CREATININE 1.1 01/02/2020    BUN 18 01/02/2020    CO2 23 01/02/2020    TSH 3.45 07/18/2016    EGFR 46.8 (L) 01/02/2020       Assessment/Plan   Problem List Items Addressed This Visit     None      Visit Diagnoses     Decreased pulses in feet    -  Primary    Relevant Orders    Ultrasound arterial leg bilateral    Ultrasound SACHIN extremity    Right leg claudication (CMS/HCC)        Relevant Orders    Ultrasound arterial leg " bilateral    Ultrasound SACHIN extremity        Patient likely has arterial peripheral vascular disease.  The discomfort in her right lower leg when she walks is likely claudication.  Is currently on Eliquis for paroxysmal atrial fibrillation.  Check ultrasound SACHIN and arterial ultrasound.  Continue present meds  To follow.  Timmy Archibald MD  3/12/2020

## 2020-03-17 ENCOUNTER — HOSPITAL ENCOUNTER (OUTPATIENT)
Dept: CARDIOLOGY | Age: 85
Discharge: HOME | End: 2020-03-17
Attending: INTERNAL MEDICINE
Payer: MEDICARE

## 2020-03-17 DIAGNOSIS — I73.9 RIGHT LEG CLAUDICATION (CMS/HCC): ICD-10-CM

## 2020-03-17 DIAGNOSIS — R09.89 DECREASED PULSES IN FEET: ICD-10-CM

## 2020-03-17 LAB
LEFT ABI: 0.98
LEFT ARM BP: 162 MMHG
LEFT DORSALIS PEDIS INDEX: 0.95
LEFT DORSALIS PEDIS: 154 MMHG
LEFT POST TIBIAL INDEX: 0.98
LEFT POSTERIOR TIBIAL: 158 MMHG
LT BRACHIAL PRESSURE: 162 MMHG
LT CFA PROX PSV: 104.68 CM/S
LT PERONEAL DIST PSV: 34.72 CM/S
LT PERONEAL DIST RATIO: 0.94
LT PERONEAL MID PSV: 36.92 CM/S
LT PERONEAL MID RATIO: 0.85
LT PERONEAL PROX PSV: 43.52 CM/S
LT POPLITEAL PROX PSV: 68.82 CM/S
LT POPLITEAL PROX RATIO: 0.56
LT POST TIBIAL DIST PSV: 68.82 CM/S
LT POST TIBIAL DIST RATIO: 1.02
LT POST TIBIAL MID PSV: 67.21 CM/S
LT POST TIBIAL MID RATIO: 0.93
LT POST TIBIAL PROX PSV: 72.05 CM/S
LT PROFUNDA PROX PSV: 94.82 CM/S
LT SFA DIST PSV: 123.75 CM/S
LT SFA MID PSV: 99.52 CM/S
LT SFA PROX PSV: 114.06 CM/S
RIGHT ABI: 0.25
RIGHT ARM BP: 155 MMHG
RIGHT DORSALIS PEDIS INDEX: 0.15
RIGHT DORSALIS PEDIS: 24 MMHG
RIGHT POST TIBIAL INDEX: 0.25
RIGHT POSTERIOR TIBIAL: 40 MMHG
RT BRACHIAL PRESSURE: 155 MMHG
RT CFA PROX PSV: 90.88 CM/S
RT PERONEAL DIST PSV: 19.32 CM/S
RT PERONEAL DIST RATIO: 1
RT PERONEAL MID PSV: 19.32 CM/S
RT PERONEAL MID RATIO: 0.92
RT PERONEAL PROX PSV: 20.96 CM/S
RT POPLITEAL PROX PSV: 19.4 CM/S
RT POPLITEAL PROX RATIO: 0.52
RT POST TIBIAL DIST PSV: 42.88 CM/S
RT POST TIBIAL DIST RATIO: 2.28
RT POST TIBIAL MID PSV: 18.77 CM/S
RT POST TIBIAL MID RATIO: 1.17
RT POST TIBIAL PROX PSV: 16.03 CM/S
RT PROFUNDA PROX PSV: 77.95 CM/S
RT SFA DIST PSV: 36.97 CM/S
RT SFA MID PSV: 25.09 CM/S
RT SFA PROX PSV: 27.54 CM/S

## 2020-03-17 PROCEDURE — 93925 LOWER EXTREMITY STUDY: CPT

## 2020-03-17 PROCEDURE — 93922 UPR/L XTREMITY ART 2 LEVELS: CPT

## 2020-03-17 NOTE — RESULT ENCOUNTER NOTE
Spoke with patient.  Discussed ultrasound arterial studies.  Significant plaque on right lower extremity.  See vascular surgeon, given telephone number.  Left leg shows minimal plaque.

## 2020-04-06 ENCOUNTER — TELEPHONE (OUTPATIENT)
Dept: VASCULAR SURGERY | Facility: CLINIC | Age: 85
End: 2020-04-06

## 2020-04-06 NOTE — TELEPHONE ENCOUNTER
Spoke to patient about her Appointment on 4/23/2020 change her Appointment to 4/27/2020 telemed Laura will call between 12pm & 3pm

## 2020-11-23 ENCOUNTER — APPOINTMENT (OUTPATIENT)
Dept: LAB | Age: 85
End: 2020-11-23
Attending: INTERNAL MEDICINE
Payer: MEDICARE

## 2020-11-23 ENCOUNTER — TRANSCRIBE ORDERS (OUTPATIENT)
Dept: LAB | Age: 85
End: 2020-11-23

## 2020-11-23 DIAGNOSIS — E78.5 HYPERLIPIDEMIA, UNSPECIFIED: ICD-10-CM

## 2020-11-23 DIAGNOSIS — R06.09 OTHER FORMS OF DYSPNEA: ICD-10-CM

## 2020-11-23 DIAGNOSIS — I25.10 ATHEROSCLEROTIC HEART DISEASE OF NATIVE CORONARY ARTERY WITHOUT ANGINA PECTORIS: ICD-10-CM

## 2020-11-23 DIAGNOSIS — I25.10 ATHEROSCLEROTIC HEART DISEASE OF NATIVE CORONARY ARTERY WITHOUT ANGINA PECTORIS: Primary | ICD-10-CM

## 2020-11-23 LAB
ANION GAP SERPL CALC-SCNC: 13 MEQ/L (ref 3–15)
BASOPHILS # BLD: 0.06 K/UL (ref 0.01–0.1)
BASOPHILS NFR BLD: 0.7 %
BUN SERPL-MCNC: 27 MG/DL (ref 8–20)
CALCIUM SERPL-MCNC: 9.7 MG/DL (ref 8.9–10.3)
CHLORIDE SERPL-SCNC: 107 MEQ/L (ref 98–109)
CHOLEST SERPL-MCNC: 217 MG/DL
CO2 SERPL-SCNC: 20 MEQ/L (ref 22–32)
CREAT SERPL-MCNC: 1.1 MG/DL (ref 0.6–1.1)
DIFFERENTIAL METHOD BLD: ABNORMAL
EOSINOPHIL # BLD: 0.26 K/UL (ref 0.04–0.36)
EOSINOPHIL NFR BLD: 3.1 %
ERYTHROCYTE [DISTWIDTH] IN BLOOD BY AUTOMATED COUNT: 14.6 % (ref 11.7–14.4)
GFR SERPL CREATININE-BSD FRML MDRD: 46.7 ML/MIN/1.73M*2
GLUCOSE SERPL-MCNC: 91 MG/DL (ref 70–99)
HCT VFR BLDCO AUTO: 45.3 % (ref 35–45)
HDLC SERPL-MCNC: 51 MG/DL
HDLC SERPL: 4.3 {RATIO}
HGB BLD-MCNC: 14.8 G/DL (ref 11.8–15.7)
IMM GRANULOCYTES # BLD AUTO: 0.01 K/UL (ref 0–0.08)
IMM GRANULOCYTES NFR BLD AUTO: 0.1 %
LDLC SERPL CALC-MCNC: 129 MG/DL
LYMPHOCYTES # BLD: 2.06 K/UL (ref 1.2–3.5)
LYMPHOCYTES NFR BLD: 24.5 %
MCH RBC QN AUTO: 30.5 PG (ref 28–33.2)
MCHC RBC AUTO-ENTMCNC: 32.7 G/DL (ref 32.2–35.5)
MCV RBC AUTO: 93.4 FL (ref 83–98)
MONOCYTES # BLD: 0.79 K/UL (ref 0.28–0.8)
MONOCYTES NFR BLD: 9.4 %
NEUTROPHILS # BLD: 5.23 K/UL (ref 1.7–7)
NEUTS SEG NFR BLD: 62.2 %
NONHDLC SERPL-MCNC: 166 MG/DL
NRBC BLD-RTO: 0 %
PDW BLD AUTO: 10.9 FL (ref 9.4–12.3)
PLATELET # BLD AUTO: 409 K/UL (ref 150–369)
POTASSIUM SERPL-SCNC: 4.4 MEQ/L (ref 3.6–5.1)
RBC # BLD AUTO: 4.85 M/UL (ref 3.93–5.22)
SODIUM SERPL-SCNC: 140 MEQ/L (ref 136–144)
TRIGL SERPL-MCNC: 183 MG/DL (ref 30–149)
WBC # BLD AUTO: 8.41 K/UL (ref 3.8–10.5)

## 2020-11-23 PROCEDURE — 85025 COMPLETE CBC W/AUTO DIFF WBC: CPT

## 2020-11-23 PROCEDURE — 80061 LIPID PANEL: CPT

## 2020-11-23 PROCEDURE — 80048 BASIC METABOLIC PNL TOTAL CA: CPT

## 2020-11-23 PROCEDURE — 36415 COLL VENOUS BLD VENIPUNCTURE: CPT

## 2021-01-23 DIAGNOSIS — Z23 ENCOUNTER FOR IMMUNIZATION: ICD-10-CM

## 2021-01-31 ENCOUNTER — IMMUNIZATIONS (OUTPATIENT)
Dept: FAMILY MEDICINE CLINIC | Facility: HOSPITAL | Age: 87
End: 2021-01-31

## 2021-01-31 DIAGNOSIS — Z23 ENCOUNTER FOR IMMUNIZATION: Primary | ICD-10-CM

## 2021-01-31 PROCEDURE — 91300 SARS-COV-2 / COVID-19 MRNA VACCINE (PFIZER-BIONTECH) 30 MCG: CPT

## 2021-01-31 PROCEDURE — 0001A SARS-COV-2 / COVID-19 MRNA VACCINE (PFIZER-BIONTECH) 30 MCG: CPT

## 2021-02-22 ENCOUNTER — IMMUNIZATIONS (OUTPATIENT)
Dept: FAMILY MEDICINE CLINIC | Facility: HOSPITAL | Age: 87
End: 2021-02-22

## 2021-02-22 DIAGNOSIS — Z23 ENCOUNTER FOR IMMUNIZATION: Primary | ICD-10-CM

## 2021-02-22 PROCEDURE — 91300 SARS-COV-2 / COVID-19 MRNA VACCINE (PFIZER-BIONTECH) 30 MCG: CPT

## 2021-02-22 PROCEDURE — 0002A SARS-COV-2 / COVID-19 MRNA VACCINE (PFIZER-BIONTECH) 30 MCG: CPT

## 2021-04-26 ENCOUNTER — APPOINTMENT (OUTPATIENT)
Dept: URBAN - METROPOLITAN AREA CLINIC 200 | Age: 87
Setting detail: DERMATOLOGY
End: 2021-04-26

## 2021-04-26 DIAGNOSIS — L57.8 OTHER SKIN CHANGES DUE TO CHRONIC EXPOSURE TO NONIONIZING RADIATION: ICD-10-CM

## 2021-04-26 DIAGNOSIS — L82.0 INFLAMED SEBORRHEIC KERATOSIS: ICD-10-CM

## 2021-04-26 PROBLEM — D23.5 OTHER BENIGN NEOPLASM OF SKIN OF TRUNK: Status: ACTIVE | Noted: 2021-04-26

## 2021-04-26 PROCEDURE — OTHER COUNSELING: OTHER

## 2021-04-26 PROCEDURE — 99213 OFFICE O/P EST LOW 20 MIN: CPT

## 2021-04-26 PROCEDURE — OTHER PRESCRIPTION: OTHER

## 2021-04-26 RX ORDER — TRIAMCINOLONE ACETONIDE 1 MG/G
CREAM TOPICAL
Qty: 1 | Refills: 3 | Status: ERX | COMMUNITY
Start: 2021-04-26

## 2021-04-26 ASSESSMENT — LOCATION ZONE DERM: LOCATION ZONE: TRUNK

## 2021-04-26 ASSESSMENT — LOCATION DETAILED DESCRIPTION DERM
LOCATION DETAILED: LEFT MEDIAL SUPERIOR CHEST
LOCATION DETAILED: RIGHT LATERAL BREAST 10-11:00 REGION

## 2021-04-26 ASSESSMENT — LOCATION SIMPLE DESCRIPTION DERM
LOCATION SIMPLE: CHEST
LOCATION SIMPLE: RIGHT BREAST

## 2021-05-19 ENCOUNTER — OFFICE VISIT (OUTPATIENT)
Dept: PRIMARY CARE | Facility: CLINIC | Age: 86
End: 2021-05-19
Payer: MEDICARE

## 2021-05-19 VITALS
DIASTOLIC BLOOD PRESSURE: 60 MMHG | WEIGHT: 164 LBS | HEART RATE: 67 BPM | BODY MASS INDEX: 30.18 KG/M2 | OXYGEN SATURATION: 95 % | RESPIRATION RATE: 16 BRPM | SYSTOLIC BLOOD PRESSURE: 116 MMHG | HEIGHT: 62 IN | TEMPERATURE: 96.6 F

## 2021-05-19 DIAGNOSIS — R26.9 GAIT DIFFICULTY: ICD-10-CM

## 2021-05-19 DIAGNOSIS — I48.0 PAROXYSMAL A-FIB (CMS/HCC): ICD-10-CM

## 2021-05-19 DIAGNOSIS — R26.89 BALANCE DISORDER: Primary | ICD-10-CM

## 2021-05-19 DIAGNOSIS — I73.9 RIGHT LEG CLAUDICATION (CMS/HCC): ICD-10-CM

## 2021-05-19 PROCEDURE — 99213 OFFICE O/P EST LOW 20 MIN: CPT | Performed by: INTERNAL MEDICINE

## 2021-05-19 RX ORDER — ASPIRIN 81 MG/1
81 TABLET ORAL DAILY
COMMUNITY
Start: 2020-06-08 | End: 2023-05-04 | Stop reason: ALTCHOICE

## 2021-05-19 RX ORDER — HYDROCHLOROTHIAZIDE 12.5 MG/1
12.5 TABLET ORAL DAILY
COMMUNITY
End: 2023-05-04 | Stop reason: ALTCHOICE

## 2021-05-19 ASSESSMENT — ENCOUNTER SYMPTOMS
HEADACHES: 0
TREMORS: 0
DIZZINESS: 0
LIGHT-HEADEDNESS: 0
SHORTNESS OF BREATH: 0
NUMBNESS: 0
PALPITATIONS: 0
FATIGUE: 0
FEVER: 0
WEAKNESS: 0

## 2021-05-19 ASSESSMENT — PATIENT HEALTH QUESTIONNAIRE - PHQ9: SUM OF ALL RESPONSES TO PHQ9 QUESTIONS 1 & 2: 0

## 2021-05-19 NOTE — PROGRESS NOTES
Subjective      Patient ID: Gali Escalera is a 89 y.o. female.    HPI  One month ago pt had a fall in her living room, not sure cause but thinks tripped on something as she was walking in the dark. Was able to get herself up no LOC.   Family found out about this recently and now pt says they insist she have PT to help with balance.     Pt says is not steady with ambulation   No weakness in legs. No dizziness. No numbness in legs.   No vision problems.     Had left hip and left knee replaced in the past.    Had vascular procedure rt leg for claudication. Did well.   History of paroxysmal A. fib, on Eliquis.  Follows with cardiologist.  On metoprolol for rate control.  Patient states he is doing well.    The following have been reviewed and updated as appropriate in this visit:  Allergies  Meds  Problems       Review of Systems   Constitutional: Negative for fatigue and fever.   Respiratory: Negative for shortness of breath.    Cardiovascular: Negative for chest pain, palpitations and leg swelling.   Musculoskeletal: Positive for gait problem.   Neurological: Negative for dizziness, tremors, syncope, weakness, light-headedness, numbness and headaches.     Current Outpatient Medications   Medication Sig Dispense Refill   • aspirin 81 mg enteric coated tablet Take 81 mg by mouth daily.     • atorvastatin (LIPITOR) 10 mg tablet Take 10 mg by mouth.     • metoprolol succinate XL (TOPROL-XL) 100 mg 24 hr tablet Take 100 mg by mouth daily.     • tretinoin (RETIN-A) 0.025 % cream APPLY TOPICALLY TO FACE AT BEDTIME  11   • apixaban (ELIQUIS) 5 mg tablet Take 5 mg by mouth 2 (two) times a day.     • hydrochlorothiazide (HYDRODIURIL) 12.5 mg tablet Take 12.5 mg by mouth daily.       No current facility-administered medications for this visit.     Past Medical History:   Diagnosis Date   • Hyperlipidemia    • Mitral valve disorder      Family History   Problem Relation Age of Onset   • Breast cancer Biological Mother   "    Allergies   Allergen Reactions   • No Known Allergies      Past Surgical History:   Procedure Laterality Date   • TOTAL HIP ARTHROPLASTY       Social History     Socioeconomic History   • Marital status:      Spouse name: None   • Number of children: None   • Years of education: None   • Highest education level: None   Occupational History   • None   Tobacco Use   • Smoking status: Never Smoker   • Smokeless tobacco: Never Used   Vaping Use   • Vaping Use: Never used   Substance and Sexual Activity   • Alcohol use: None   • Drug use: None   • Sexual activity: None   Other Topics Concern   • None   Social History Narrative   • None     Social Determinants of Health     Financial Resource Strain:    • Difficulty of Paying Living Expenses:    Food Insecurity:    • Worried About Running Out of Food in the Last Year:    • Ran Out of Food in the Last Year:    Transportation Needs:    • Lack of Transportation (Medical):    • Lack of Transportation (Non-Medical):    Physical Activity:    • Days of Exercise per Week:    • Minutes of Exercise per Session:    Stress:    • Feeling of Stress :    Social Connections:    • Frequency of Communication with Friends and Family:    • Frequency of Social Gatherings with Friends and Family:    • Attends Judaism Services:    • Active Member of Clubs or Organizations:    • Attends Club or Organization Meetings:    • Marital Status:    Intimate Partner Violence:    • Fear of Current or Ex-Partner:    • Emotionally Abused:    • Physically Abused:    • Sexually Abused:        Objective     Vitals:   Vitals:    05/19/21 1345   BP: 116/60   BP Location: Right upper arm   Patient Position: Sitting   Pulse: 67   Resp: 16   Temp: (!) 35.9 °C (96.6 °F)   TempSrc: Temporal   SpO2: 95%   Weight: 74.4 kg (164 lb)   Height: 1.562 m (5' 1.5\")       Physical Exam  Vitals and nursing note reviewed.   Constitutional:       General: She is not in acute distress.     Appearance: Normal " appearance. She is normal weight.      Comments: Patient appears well.  She appears younger than her stated age.  She is awake and alert, answers all questions appropriately.   HENT:      Head: Atraumatic.   Cardiovascular:      Rate and Rhythm: Normal rate and regular rhythm.      Heart sounds: Normal heart sounds.   Pulmonary:      Effort: Pulmonary effort is normal.      Breath sounds: Normal breath sounds.   Musculoskeletal:      Right lower leg: No edema.      Left lower leg: No edema.      Comments: Surgical scar at left hip and left knee.  Flexion of left knee is full without discomfort.  Range of motion of left hip is dramatically decreased but has no discomfort.  Normal range of motion of right hip.  No discomfort.  Range of motion of right knee is normal, no discomfort, has much crepitus of the right knee.  She is able to rise from a chair without assistance or using her arms.  Gait is steady.  No muscle weakness.  Needs assistance getting off the table onto the floor.   Neurological:      Mental Status: She is alert and oriented to person, place, and time.      Cranial Nerves: No cranial nerve deficit.      Motor: No weakness.   Psychiatric:         Mood and Affect: Mood normal.         Behavior: Behavior normal.         Thought Content: Thought content normal.         Labs  Lab Results   Component Value Date    WBC 8.41 11/23/2020    HGB 14.8 11/23/2020    HCT 45.3 (H) 11/23/2020     (H) 11/23/2020    CHOL 217 (H) 11/23/2020    TRIG 183 (H) 11/23/2020    HDL 51 (L) 11/23/2020    LDLCALC 129 (H) 11/23/2020    ALT 18 01/02/2020    AST 18 01/02/2020     11/23/2020    K 4.4 11/23/2020    GLUCOSE 91 11/23/2020     11/23/2020    CREATININE 1.1 11/23/2020    BUN 27 (H) 11/23/2020    CO2 20 (L) 11/23/2020    TSH 3.45 07/18/2016    EGFR 46.7 (L) 11/23/2020       Assessment/Plan   Problem List Items Addressed This Visit        Nervous    Right leg claudication (CMS/HCC)      Other Visit  Diagnoses     Balance disorder    -  Primary    Relevant Orders    Ambulatory referral to Physical Therapy    Gait difficulty        Relevant Orders    Ambulatory referral to Physical Therapy    Paroxysmal A-fib (CMS/[facility])        Relevant Medications    aspirin 81 mg enteric coated tablet      -Gait problems.  Likely secondary to decreased range of motion of left hip.  Had left hip replaced in the past.  Needs muscle strengthening exercises.  Sent to physical therapy.  Overall patient is doing very well.  -Paroxysmal atrial fibrillation rate controlled, on Eliquis and metoprolol.  Follows with cardiologist.  -Had claudication in the right leg that was corrected with interventional vascular surgery.      Timmy Archibald MD  5/19/2021

## 2022-04-26 ENCOUNTER — APPOINTMENT (OUTPATIENT)
Dept: URBAN - METROPOLITAN AREA CLINIC 200 | Age: 88
Setting detail: DERMATOLOGY
End: 2022-04-28

## 2022-04-26 DIAGNOSIS — L82.0 INFLAMED SEBORRHEIC KERATOSIS: ICD-10-CM

## 2022-04-26 DIAGNOSIS — L57.8 OTHER SKIN CHANGES DUE TO CHRONIC EXPOSURE TO NONIONIZING RADIATION: ICD-10-CM

## 2022-04-26 DIAGNOSIS — Z11.52 ENCOUNTER FOR SCREENING FOR COVID-19: ICD-10-CM

## 2022-04-26 PROBLEM — J30.1 ALLERGIC RHINITIS DUE TO POLLEN: Status: ACTIVE | Noted: 2022-04-26

## 2022-04-26 PROBLEM — I10 ESSENTIAL (PRIMARY) HYPERTENSION: Status: ACTIVE | Noted: 2022-04-26

## 2022-04-26 PROCEDURE — OTHER SUNSCREEN RECOMMENDATIONS: OTHER

## 2022-04-26 PROCEDURE — OTHER SCREENING FOR COVID-19: OTHER

## 2022-04-26 PROCEDURE — OTHER PRESCRIPTION MEDICATION MANAGEMENT: OTHER

## 2022-04-26 PROCEDURE — 99213 OFFICE O/P EST LOW 20 MIN: CPT

## 2022-04-26 PROCEDURE — OTHER PRESCRIPTION: OTHER

## 2022-04-26 PROCEDURE — OTHER COUNSELING: OTHER

## 2022-04-26 RX ORDER — TRIAMCINOLONE ACETONIDE 1 MG/G
CREAM TOPICAL
Qty: 1 | Refills: 4 | Status: ERX | COMMUNITY
Start: 2022-04-26

## 2022-04-26 ASSESSMENT — LOCATION SIMPLE DESCRIPTION DERM
LOCATION SIMPLE: ABDOMEN
LOCATION SIMPLE: RIGHT BREAST

## 2022-04-26 ASSESSMENT — LOCATION DETAILED DESCRIPTION DERM
LOCATION DETAILED: RIGHT INFRAMAMMARY CREASE (OUTER QUADRANT)
LOCATION DETAILED: PERIUMBILICAL SKIN

## 2022-04-26 ASSESSMENT — LOCATION ZONE DERM: LOCATION ZONE: TRUNK

## 2022-05-20 ENCOUNTER — TELEPHONE (OUTPATIENT)
Dept: PRIMARY CARE | Facility: CLINIC | Age: 87
End: 2022-05-20

## 2022-05-20 ENCOUNTER — OFFICE VISIT (OUTPATIENT)
Dept: PRIMARY CARE | Facility: CLINIC | Age: 87
End: 2022-05-20
Payer: MEDICARE

## 2022-05-20 VITALS
TEMPERATURE: 98 F | HEART RATE: 58 BPM | DIASTOLIC BLOOD PRESSURE: 68 MMHG | RESPIRATION RATE: 18 BRPM | WEIGHT: 161 LBS | OXYGEN SATURATION: 98 % | HEIGHT: 62 IN | BODY MASS INDEX: 29.63 KG/M2 | SYSTOLIC BLOOD PRESSURE: 128 MMHG

## 2022-05-20 DIAGNOSIS — B34.9 VIRAL SYNDROME: Primary | ICD-10-CM

## 2022-05-20 LAB
FLUAV RNA SPEC QL NAA+PROBE: NEGATIVE
FLUBV RNA SPEC QL NAA+PROBE: NEGATIVE
RSV RNA SPEC QL NAA+PROBE: NEGATIVE
SARS-COV-2 RNA RESP QL NAA+PROBE: NEGATIVE

## 2022-05-20 PROCEDURE — 87637 SARSCOV2&INF A&B&RSV AMP PRB: CPT

## 2022-05-20 PROCEDURE — 99212 OFFICE O/P EST SF 10 MIN: CPT

## 2022-05-20 NOTE — TELEPHONE ENCOUNTER
Patient was seen on 5/18/2022 has not been feeling well body aches fatigue negative covid test would like to speak with a nurse or be seen by primary care.

## 2022-05-20 NOTE — TELEPHONE ENCOUNTER
Spoke to Carolina. Pt cannot start on any antivirals until we receive the test results.    Pt notified and understood

## 2022-05-20 NOTE — PROGRESS NOTES
"Subjective      Shortness of Breath (Last night had shortness of breath. ), Cough (Started yesterday with a dry cough. ), and Abdominal Pain (Stomach pains last night, went away this morning. Some nausea.)     Patient ID: Gali Escalera is a 89 y.o. female presents today c/o:    HPI  Last night was feeling \"funny\" could not get comfortable.  Nauseous, dry hacking cough, fatigue, sinus pressure, shortness of breath    Denies fever, chills, diarrhea, vomiting, body aches, ear pain/pressure    Took 2 covid tests this morning and were both NEG    Pfizer x 3  Did not get the flu vaccine today    Has not taken any medications for this illness, compliant with all current medications            The following have been reviewed and updated as appropriate in this visit:          Review of Systems   All other systems reviewed and are negative.    Current Outpatient Medications   Medication Sig Dispense Refill   • apixaban (ELIQUIS) 5 mg tablet Take 5 mg by mouth daily.     • aspirin 81 mg enteric coated tablet Take 81 mg by mouth daily.     • atorvastatin (LIPITOR) 10 mg tablet Take 10 mg by mouth.     • metoprolol succinate XL (TOPROL-XL) 100 mg 24 hr tablet Take 100 mg by mouth daily.     • tretinoin (RETIN-A) 0.025 % cream APPLY TOPICALLY TO FACE AT BEDTIME  11   • hydrochlorothiazide (HYDRODIURIL) 12.5 mg tablet Take 12.5 mg by mouth daily.       No current facility-administered medications for this visit.     Past Medical History:   Diagnosis Date   • Hyperlipidemia    • Mitral valve disorder      Family History   Problem Relation Age of Onset   • Breast cancer Biological Mother      Allergies   Allergen Reactions   • No Known Allergies      Past Surgical History:   Procedure Laterality Date   • TOTAL HIP ARTHROPLASTY       Social History     Socioeconomic History   • Marital status:      Spouse name: None   • Number of children: None   • Years of education: None   • Highest education level: None   Tobacco Use   • " "Smoking status: Never Smoker   • Smokeless tobacco: Never Used   Vaping Use   • Vaping Use: Never used            Objective     Vitals:   Vitals:    05/20/22 1139   BP: 128/68   BP Location: Left upper arm   Patient Position: Sitting   Pulse: (!) 58   Resp: 18   Temp: 36.7 °C (98 °F)   TempSrc: Temporal   SpO2: 98%   Weight: 73 kg (161 lb)   Height: 1.562 m (5' 1.5\")       Physical Exam  Vitals and nursing note reviewed.   Constitutional:       Appearance: Normal appearance. She is normal weight.   HENT:      Head: Normocephalic and atraumatic.      Right Ear: Tympanic membrane normal.      Left Ear: Tympanic membrane normal.      Nose: Congestion and rhinorrhea present.      Mouth/Throat:      Mouth: Mucous membranes are moist.      Pharynx: Oropharynx is clear. No posterior oropharyngeal erythema.   Eyes:      Extraocular Movements: Extraocular movements intact.      Conjunctiva/sclera: Conjunctivae normal.      Pupils: Pupils are equal, round, and reactive to light.   Cardiovascular:      Rate and Rhythm: Normal rate and regular rhythm.      Pulses: Normal pulses.      Heart sounds: Normal heart sounds. No murmur heard.  Pulmonary:      Effort: Pulmonary effort is normal.      Breath sounds: Normal breath sounds. No wheezing.   Abdominal:      General: Abdomen is flat. Bowel sounds are normal.      Palpations: Abdomen is soft.      Tenderness: There is no abdominal tenderness.   Musculoskeletal:         General: Normal range of motion.      Cervical back: Normal range of motion and neck supple.   Lymphadenopathy:      Cervical: No cervical adenopathy.   Skin:     General: Skin is warm and dry.   Neurological:      General: No focal deficit present.      Mental Status: She is alert and oriented to person, place, and time.   Psychiatric:         Mood and Affect: Mood normal.         Behavior: Behavior normal.         Thought Content: Thought content normal.         Judgment: Judgment normal.         Labs  Lab " Results   Component Value Date    WBC 8.41 11/23/2020    HGB 14.8 11/23/2020    HCT 45.3 (H) 11/23/2020     (H) 11/23/2020    CHOL 217 (H) 11/23/2020    TRIG 183 (H) 11/23/2020    HDL 51 (L) 11/23/2020    LDLCALC 129 (H) 11/23/2020    ALT 18 01/02/2020    AST 18 01/02/2020     11/23/2020    K 4.4 11/23/2020    GLUCOSE 91 11/23/2020     11/23/2020    CREATININE 1.1 11/23/2020    BUN 27 (H) 11/23/2020    CO2 20 (L) 11/23/2020    TSH 3.45 07/18/2016    EGFR 46.7 (L) 11/23/2020              Assessment/Plan   Gali NICHOLSON was seen today for shortness of breath, cough and abdominal pain.    Diagnoses and all orders for this visit:    Viral syndrome  Comments:  will send test for covid/flu/rsv.  encouraged patient to socially distance, wear her mask and wash hands.  use caution as we await results of test.    Orders:  -     COVID- 19 PCR Symptomatic (includes FLU A/B & RSV) - [facility]; Future  -     COVID- 19 PCR Symptomatic (includes FLU A/B & RSV) - [facility]         Patient verbalizes understanding and agrees with plan of care today.        YASMEEN Campos  5/20/2022

## 2022-05-20 NOTE — TELEPHONE ENCOUNTER
"Pt was seen today by Carolina and she may have COVID. They are still waiting for the test results but they want to know if, given her age, she should start an \"antiviral\". Can someone reach out to discuss?  "

## 2022-05-20 NOTE — TELEPHONE ENCOUNTER
Symptoms started last night, Tired, nausea, runny nose - no diarrhea, no vomiting, no cough, no nasal congestion, no ABD pain - Son wants appt for today

## 2022-05-31 ENCOUNTER — PATIENT OUTREACH (OUTPATIENT)
Dept: CASE MANAGEMENT | Facility: CLINIC | Age: 87
End: 2022-05-31
Payer: MEDICARE

## 2022-05-31 NOTE — PROGRESS NOTES
TCM call #1   Left voicemail message, requesting call back.    Kandi Blackburn RN BSN   [phone]

## 2022-06-06 ENCOUNTER — TELEPHONE (OUTPATIENT)
Dept: PRIMARY CARE | Facility: CLINIC | Age: 87
End: 2022-06-06

## 2022-06-06 NOTE — TELEPHONE ENCOUNTER
This is not something that needs to be marked as urgent. Urgent means it is a medical emergency that we respond within an hour.

## 2022-06-06 NOTE — TELEPHONE ENCOUNTER
Allyson from [facility] would like home care orders signed for patient information is being faxed over on 6/6/2022.

## 2023-05-04 ENCOUNTER — APPOINTMENT (EMERGENCY)
Dept: RADIOLOGY | Facility: HOSPITAL | Age: 88
DRG: 309 | End: 2023-05-04
Attending: EMERGENCY MEDICINE
Payer: MEDICARE

## 2023-05-04 ENCOUNTER — HOSPITAL ENCOUNTER (INPATIENT)
Facility: HOSPITAL | Age: 88
LOS: 1 days | Discharge: HOME HEALTH | DRG: 309 | End: 2023-05-05
Attending: EMERGENCY MEDICINE | Admitting: STUDENT IN AN ORGANIZED HEALTH CARE EDUCATION/TRAINING PROGRAM
Payer: MEDICARE

## 2023-05-04 DIAGNOSIS — I48.91 ATRIAL FIBRILLATION, UNSPECIFIED TYPE (CMS/HCC): ICD-10-CM

## 2023-05-04 DIAGNOSIS — R07.9 CHEST PAIN, UNSPECIFIED TYPE: Primary | ICD-10-CM

## 2023-05-04 DIAGNOSIS — R00.0 TACHYCARDIA: ICD-10-CM

## 2023-05-04 PROBLEM — I50.22 CHRONIC SYSTOLIC CHF (CONGESTIVE HEART FAILURE) (CMS/HCC): Status: ACTIVE | Noted: 2023-05-04

## 2023-05-04 PROBLEM — I5A MYOCARDIAL INJURY: Status: ACTIVE | Noted: 2023-05-04

## 2023-05-04 PROBLEM — I48.0 PAROXYSMAL ATRIAL FIBRILLATION (CMS/HCC): Status: ACTIVE | Noted: 2019-11-22

## 2023-05-04 LAB
ALBUMIN SERPL-MCNC: 3 G/DL (ref 3.4–5)
ALP SERPL-CCNC: 75 IU/L (ref 35–126)
ALT SERPL-CCNC: 35 IU/L (ref 11–54)
ANION GAP SERPL CALC-SCNC: 9 MEQ/L (ref 3–15)
APTT PPP: 74 SEC (ref 23–35)
APTT PPP: <22 SEC (ref 23–35)
AST SERPL-CCNC: 46 IU/L (ref 15–41)
ATRIAL RATE: 170
BASOPHILS # BLD: 0.04 K/UL (ref 0.01–0.1)
BASOPHILS NFR BLD: 0.3 %
BILIRUB SERPL-MCNC: 2 MG/DL (ref 0.3–1.2)
BUN SERPL-MCNC: 38 MG/DL (ref 8–20)
CALCIUM SERPL-MCNC: 8.6 MG/DL (ref 8.9–10.3)
CHLORIDE SERPL-SCNC: 110 MEQ/L (ref 98–109)
CO2 SERPL-SCNC: 20 MEQ/L (ref 22–32)
CREAT SERPL-MCNC: 2 MG/DL (ref 0.6–1.1)
DIFFERENTIAL METHOD BLD: ABNORMAL
EOSINOPHIL # BLD: 0.09 K/UL (ref 0.04–0.36)
EOSINOPHIL NFR BLD: 0.6 %
ERYTHROCYTE [DISTWIDTH] IN BLOOD BY AUTOMATED COUNT: 15.1 % (ref 11.7–14.4)
GFR SERPL CREATININE-BSD FRML MDRD: 23.3 ML/MIN/1.73M*2
GLUCOSE SERPL-MCNC: 111 MG/DL (ref 70–99)
HCT VFR BLDCO AUTO: 40.1 % (ref 35–45)
HGB BLD-MCNC: 12.5 G/DL (ref 11.8–15.7)
IMM GRANULOCYTES # BLD AUTO: 0.09 K/UL (ref 0–0.08)
IMM GRANULOCYTES NFR BLD AUTO: 0.6 %
INR PPP: 1.1
LYMPHOCYTES # BLD: 1.64 K/UL (ref 1.2–3.5)
LYMPHOCYTES NFR BLD: 11 %
MAGNESIUM SERPL-MCNC: 2.5 MG/DL (ref 1.8–2.5)
MCH RBC QN AUTO: 30.9 PG (ref 28–33.2)
MCHC RBC AUTO-ENTMCNC: 31.2 G/DL (ref 32.2–35.5)
MCV RBC AUTO: 99 FL (ref 83–98)
MONOCYTES # BLD: 1.19 K/UL (ref 0.28–0.8)
MONOCYTES NFR BLD: 8 %
NEUTROPHILS # BLD: 11.83 K/UL (ref 1.7–7)
NEUTS SEG NFR BLD: 79.5 %
NRBC BLD-RTO: 0 %
PDW BLD AUTO: 10.9 FL (ref 9.4–12.3)
PLATELET # BLD AUTO: 319 K/UL (ref 150–369)
POTASSIUM SERPL-SCNC: 5.7 MEQ/L (ref 3.6–5.1)
PROT SERPL-MCNC: 6.3 G/DL (ref 6–8.2)
PROTHROMBIN TIME: 14.4 SEC (ref 12.2–14.5)
QRS DURATION: 104
QT INTERVAL: 248
QTC CALCULATION(BAZETT): 417
R AXIS: -39
RBC # BLD AUTO: 4.05 M/UL (ref 3.93–5.22)
SODIUM SERPL-SCNC: 139 MEQ/L (ref 136–144)
T WAVE AXIS: 172
TROPONIN I SERPL HS-MCNC: 24.4 PG/ML
TROPONIN I SERPL HS-MCNC: 26.3 PG/ML
TSH SERPL DL<=0.05 MIU/L-ACNC: 3.64 MIU/L (ref 0.34–5.6)
VENTRICULAR RATE: 170
WBC # BLD AUTO: 14.88 K/UL (ref 3.8–10.5)

## 2023-05-04 PROCEDURE — 25800000 HC PHARMACY IV SOLUTIONS: Performed by: EMERGENCY MEDICINE

## 2023-05-04 PROCEDURE — 99222 1ST HOSP IP/OBS MODERATE 55: CPT | Performed by: STUDENT IN AN ORGANIZED HEALTH CARE EDUCATION/TRAINING PROGRAM

## 2023-05-04 PROCEDURE — 85025 COMPLETE CBC W/AUTO DIFF WBC: CPT | Performed by: EMERGENCY MEDICINE

## 2023-05-04 PROCEDURE — 93005 ELECTROCARDIOGRAM TRACING: CPT | Performed by: EMERGENCY MEDICINE

## 2023-05-04 PROCEDURE — 84484 ASSAY OF TROPONIN QUANT: CPT | Performed by: EMERGENCY MEDICINE

## 2023-05-04 PROCEDURE — 25000000 HC PHARMACY GENERAL: Performed by: EMERGENCY MEDICINE

## 2023-05-04 PROCEDURE — 36415 COLL VENOUS BLD VENIPUNCTURE: CPT | Performed by: EMERGENCY MEDICINE

## 2023-05-04 PROCEDURE — 93010 ELECTROCARDIOGRAM REPORT: CPT | Performed by: INTERNAL MEDICINE

## 2023-05-04 PROCEDURE — 87040 BLOOD CULTURE FOR BACTERIA: CPT | Performed by: STUDENT IN AN ORGANIZED HEALTH CARE EDUCATION/TRAINING PROGRAM

## 2023-05-04 PROCEDURE — 83735 ASSAY OF MAGNESIUM: CPT | Performed by: EMERGENCY MEDICINE

## 2023-05-04 PROCEDURE — 84484 ASSAY OF TROPONIN QUANT: CPT | Mod: 91 | Performed by: EMERGENCY MEDICINE

## 2023-05-04 PROCEDURE — 63600000 HC DRUGS/DETAIL CODE: Performed by: PHYSICIAN ASSISTANT

## 2023-05-04 PROCEDURE — 85610 PROTHROMBIN TIME: CPT | Performed by: EMERGENCY MEDICINE

## 2023-05-04 PROCEDURE — 96374 THER/PROPH/DIAG INJ IV PUSH: CPT | Mod: 59

## 2023-05-04 PROCEDURE — 25000000 HC PHARMACY GENERAL: Performed by: STUDENT IN AN ORGANIZED HEALTH CARE EDUCATION/TRAINING PROGRAM

## 2023-05-04 PROCEDURE — 85730 THROMBOPLASTIN TIME PARTIAL: CPT | Mod: 91 | Performed by: EMERGENCY MEDICINE

## 2023-05-04 PROCEDURE — 96361 HYDRATE IV INFUSION ADD-ON: CPT

## 2023-05-04 PROCEDURE — 96360 HYDRATION IV INFUSION INIT: CPT

## 2023-05-04 PROCEDURE — 71045 X-RAY EXAM CHEST 1 VIEW: CPT

## 2023-05-04 PROCEDURE — 80053 COMPREHEN METABOLIC PANEL: CPT | Performed by: EMERGENCY MEDICINE

## 2023-05-04 PROCEDURE — 99285 EMERGENCY DEPT VISIT HI MDM: CPT | Mod: 25

## 2023-05-04 PROCEDURE — 20600000 HC ROOM AND CARE INTERMEDIATE/TELEMETRY

## 2023-05-04 PROCEDURE — 84443 ASSAY THYROID STIM HORMONE: CPT | Performed by: STUDENT IN AN ORGANIZED HEALTH CARE EDUCATION/TRAINING PROGRAM

## 2023-05-04 PROCEDURE — 25800000 HC PHARMACY IV SOLUTIONS: Performed by: PHYSICIAN ASSISTANT

## 2023-05-04 PROCEDURE — 63700000 HC SELF-ADMINISTRABLE DRUG: Performed by: STUDENT IN AN ORGANIZED HEALTH CARE EDUCATION/TRAINING PROGRAM

## 2023-05-04 RX ORDER — NITROGLYCERIN 0.4 MG/1
0.4 TABLET SUBLINGUAL EVERY 5 MIN PRN
COMMUNITY
End: 2023-05-12 | Stop reason: SDUPTHER

## 2023-05-04 RX ORDER — CLOPIDOGREL BISULFATE 75 MG/1
75 TABLET ORAL DAILY
Status: DISCONTINUED | OUTPATIENT
Start: 2023-05-05 | End: 2023-05-04

## 2023-05-04 RX ORDER — HEPARIN SODIUM 10000 [USP'U]/100ML
100-4000 INJECTION, SOLUTION INTRAVENOUS
Status: DISCONTINUED | OUTPATIENT
Start: 2023-05-04 | End: 2023-05-05

## 2023-05-04 RX ORDER — ACETAMINOPHEN 650 MG/20.3ML
500 LIQUID ORAL EVERY 4 HOURS PRN
Status: DISCONTINUED | OUTPATIENT
Start: 2023-05-04 | End: 2023-05-05 | Stop reason: HOSPADM

## 2023-05-04 RX ORDER — ASPIRIN 81 MG/1
81 TABLET ORAL DAILY
Status: DISCONTINUED | OUTPATIENT
Start: 2023-05-05 | End: 2023-05-04

## 2023-05-04 RX ORDER — ASPIRIN 81 MG/1
81 TABLET ORAL DAILY
COMMUNITY
End: 2023-05-05 | Stop reason: HOSPADM

## 2023-05-04 RX ORDER — VALSARTAN 40 MG/1
40 TABLET ORAL DAILY
COMMUNITY
Start: 2023-03-15 | End: 2023-05-05 | Stop reason: HOSPADM

## 2023-05-04 RX ORDER — ATORVASTATIN CALCIUM 10 MG/1
10 TABLET, FILM COATED ORAL
Status: DISCONTINUED | OUTPATIENT
Start: 2023-05-05 | End: 2023-05-05 | Stop reason: HOSPADM

## 2023-05-04 RX ORDER — ISOSORBIDE MONONITRATE 30 MG/1
30 TABLET, EXTENDED RELEASE ORAL DAILY
COMMUNITY
Start: 2023-03-09

## 2023-05-04 RX ORDER — DEXTROSE 40 %
15-30 GEL (GRAM) ORAL AS NEEDED
Status: DISCONTINUED | OUTPATIENT
Start: 2023-05-04 | End: 2023-05-05 | Stop reason: HOSPADM

## 2023-05-04 RX ORDER — DEXTROSE 50 % IN WATER (D50W) INTRAVENOUS SYRINGE
25 AS NEEDED
Status: DISCONTINUED | OUTPATIENT
Start: 2023-05-04 | End: 2023-05-05 | Stop reason: HOSPADM

## 2023-05-04 RX ORDER — FUROSEMIDE 20 MG/1
20 TABLET ORAL DAILY
COMMUNITY
Start: 2023-03-15

## 2023-05-04 RX ORDER — METOPROLOL SUCCINATE 100 MG/1
100 TABLET, EXTENDED RELEASE ORAL EVERY 12 HOURS
Status: DISCONTINUED | OUTPATIENT
Start: 2023-05-04 | End: 2023-05-05 | Stop reason: HOSPADM

## 2023-05-04 RX ORDER — ACETAMINOPHEN 500 MG
500 TABLET ORAL EVERY 4 HOURS PRN
COMMUNITY

## 2023-05-04 RX ORDER — DILTIAZEM HCL IN NACL,ISO-OSM 125 MG/125
5-15 PLASTIC BAG, INJECTION (ML) INTRAVENOUS
Status: DISCONTINUED | OUTPATIENT
Start: 2023-05-04 | End: 2023-05-05

## 2023-05-04 RX ORDER — IBUPROFEN 200 MG
16-32 TABLET ORAL AS NEEDED
Status: DISCONTINUED | OUTPATIENT
Start: 2023-05-04 | End: 2023-05-05 | Stop reason: HOSPADM

## 2023-05-04 RX ORDER — CLOPIDOGREL BISULFATE 75 MG/1
75 TABLET ORAL DAILY
COMMUNITY
Start: 2023-03-15 | End: 2023-05-05 | Stop reason: HOSPADM

## 2023-05-04 RX ADMIN — Medication 15 MG/HR: at 15:43

## 2023-05-04 RX ADMIN — AMIODARONE HYDROCHLORIDE 0.5 MG/MIN: 50 INJECTION, SOLUTION INTRAVENOUS at 18:30

## 2023-05-04 RX ADMIN — Medication 5 MG/HR: at 08:06

## 2023-05-04 RX ADMIN — Medication 15 MG/HR: at 23:59

## 2023-05-04 RX ADMIN — AMIODARONE HYDROCHLORIDE 1 MG/MIN: 50 INJECTION, SOLUTION INTRAVENOUS at 11:53

## 2023-05-04 RX ADMIN — METOPROLOL SUCCINATE ER TABLETS 100 MG: 100 TABLET, FILM COATED, EXTENDED RELEASE ORAL at 21:40

## 2023-05-04 RX ADMIN — HEPARIN SODIUM 1000 UNITS/HR: 10000 INJECTION, SOLUTION INTRAVENOUS at 11:49

## 2023-05-04 RX ADMIN — SODIUM CHLORIDE 500 ML: 9 INJECTION, SOLUTION INTRAVENOUS at 08:05

## 2023-05-04 RX ADMIN — SODIUM CHLORIDE 500 ML: 9 INJECTION, SOLUTION INTRAVENOUS at 07:29

## 2023-05-04 ASSESSMENT — ENCOUNTER SYMPTOMS
STRIDOR: 0
HEADACHES: 0
DIZZINESS: 0
FATIGUE: 0
HEMATOCHEZIA: 0
NEAR-SYNCOPE: 0
FACIAL ASYMMETRY: 0
SHORTNESS OF BREATH: 1
DYSPNEA ON EXERTION: 1
VOMITING: 0
NECK PAIN: 0
HEMATURIA: 0
TREMORS: 0
BRUISES/BLEEDS EASILY: 0
WHEEZING: 0
ABDOMINAL PAIN: 0
WEAKNESS: 0
CHILLS: 0
CHEST TIGHTNESS: 0
FLANK PAIN: 0
SEIZURES: 0
FEVER: 0
LIGHT-HEADEDNESS: 0
NECK STIFFNESS: 0
DYSURIA: 0
COUGH: 0
DIARRHEA: 0
CLAUDICATION: 0
DIAPHORESIS: 0
HEARTBURN: 0
NAUSEA: 0
FALLS: 0
COLOR CHANGE: 0
SPEECH DIFFICULTY: 0
PND: 0
ALTERED MENTAL STATUS: 0
SHORTNESS OF BREATH: 0
SYNCOPE: 0
BACK PAIN: 0
LOWER EXTREMITY EDEMA: 0
WEAKNESS: 1
PALPITATIONS: 0
NUMBNESS: 0
ORTHOPNEA: 0

## 2023-05-04 ASSESSMENT — COGNITIVE AND FUNCTIONAL STATUS - GENERAL
TOILETING: 3 - A LITTLE
CLIMB 3 TO 5 STEPS WITH RAILING: 3 - A LITTLE
WALKING IN HOSPITAL ROOM: 3 - A LITTLE
MOVING TO AND FROM BED TO CHAIR: 3 - A LITTLE
DRESSING REGULAR LOWER BODY CLOTHING: 4 - NONE
DRESSING REGULAR UPPER BODY CLOTHING: 4 - NONE
HELP NEEDED FOR BATHING: 3 - A LITTLE
EATING MEALS: 4 - NONE
STANDING UP FROM CHAIR USING ARMS: 3 - A LITTLE
HELP NEEDED FOR PERSONAL GROOMING: 4 - NONE

## 2023-05-04 NOTE — ED PROVIDER NOTES
Emergency Medicine Note  HPI   HISTORY OF PRESENT ILLNESS       History provided by:  Patient  Chest Pain  Pain location:  Substernal area  Pain quality: pressure    Pain radiates to:  Does not radiate  Pain severity:  Moderate  Duration:  5 hours  Timing:  Constant  Progression:  Resolved  Chronicity:  Recurrent  Context comment:  Patient with history of angina and A-fib.  Started getting chest pain last night and took 3 of her sublingual nitroglycerin before it went away.  Called 911 during this time and was found to be in rapid A-fib and hypotensive.  Relieved by:  Nitroglycerin  Worsened by:  Nothing  Associated symptoms: no abdominal pain, no back pain, no claudication, no cough, no diaphoresis, no dizziness, no fatigue, no fever, no headache, no heartburn, no lower extremity edema, no nausea, no near-syncope, no numbness, no orthopnea, no palpitations, no PND, no shortness of breath, no syncope, no vomiting and no weakness    Atrial Fibrillation  Associated symptoms: chest pain    Associated symptoms: no abdominal pain, no cough, no diarrhea, no fatigue, no fever, no headaches, no nausea, no rash, no shortness of breath, no vomiting and no wheezing          Patient History   PAST HISTORY     Reviewed from Nursing Triage:  Allergies  Meds       Past Medical History:   Diagnosis Date   • Coronary artery disease     2014 cath showed nonobstructive CAD with 50% LAD stenosis, 30% RCA stenosis   • Hyperlipidemia    • Mitral valve disorder    • Paroxysmal atrial fibrillation (CMS/HCC)    • Systolic CHF (CMS/HCC)     LVEF 45% in 5/2022. LVEF recovered to 55% 1/2023       Past Surgical History:   Procedure Laterality Date   • TOTAL HIP ARTHROPLASTY         Family History   Problem Relation Age of Onset   • Breast cancer Biological Mother        Social History     Tobacco Use   • Smoking status: Former     Types: Cigarettes   • Smokeless tobacco: Never   Vaping Use   • Vaping status: Never Used         Review of  Systems   REVIEW OF SYSTEMS     Review of Systems   Constitutional: Negative for chills, diaphoresis, fatigue and fever.   Respiratory: Negative for cough, chest tightness, shortness of breath, wheezing and stridor.    Cardiovascular: Positive for chest pain. Negative for palpitations, orthopnea, claudication, leg swelling, syncope, PND and near-syncope.   Gastrointestinal: Negative for abdominal pain, diarrhea, heartburn, nausea and vomiting.   Genitourinary: Negative for dysuria and flank pain.   Musculoskeletal: Negative for back pain, neck pain and neck stiffness.   Skin: Negative for color change, pallor and rash.   Neurological: Negative for dizziness, tremors, seizures, syncope, facial asymmetry, speech difficulty, weakness, light-headedness, numbness and headaches.         VITALS     ED Vitals    Date/Time Temp Pulse Resp BP SpO2 [facility]   05/04/23 1330 -- 101 20 109/72 95 % [facility]   05/04/23 1238 -- 110 21 105/78 94 % [facility]   05/04/23 1155 -- 129 34 107/83 100 % [facility]   05/04/23 1015 -- 145 25 113/90 100 % [facility]   05/04/23 0931 -- 132 23 110/83 100 % [facility]   05/04/23 0816 -- 178 19 92/72 95 % [facility]   05/04/23 0806 -- 158 -- 87/47 -- [facility]   05/04/23 0725 36.4 °C (97.5 °F) 160 25 86/68 97 % [facility]        Pulse Ox %: 97 % (05/04/23 0733)  Pulse Ox Interpretation: Normal (05/04/23 0733)  Heart Rate: 160 (05/04/23 0733)  Rhythm Strip Interpretation: Atrial Fibrillation (05/04/23 0733)     Physical Exam   PHYSICAL EXAM     Physical Exam  Vitals and nursing note reviewed.   Constitutional:       Appearance: She is well-developed.   HENT:      Head: Normocephalic.   Eyes:      Pupils: Pupils are equal, round, and reactive to light.   Cardiovascular:      Rate and Rhythm: Tachycardia present. Rhythm irregular.      Heart sounds: Normal heart sounds. No murmur heard.  Pulmonary:      Effort: Pulmonary effort is normal.      Breath sounds: Normal breath sounds.   Abdominal:      General: Bowel sounds are normal.      Palpations: Abdomen  is soft. There is no mass.      Tenderness: There is no abdominal tenderness.   Musculoskeletal:      Cervical back: Neck supple.      Right lower leg: No tenderness. No edema.      Left lower leg: No tenderness. No edema.   Skin:     General: Skin is warm and dry.      Capillary Refill: Capillary refill takes less than 2 seconds.   Neurological:      General: No focal deficit present.      Mental Status: She is alert.           PROCEDURES     Critical Care    Performed by: Addy Gore MD  Authorized by: Addy Gore MD    Critical care provider statement:     Critical care time (minutes):  35    Critical care time was exclusive of:  Separately billable procedures and treating other patients    Critical care was necessary to treat or prevent imminent or life-threatening deterioration of the following conditions:  Circulatory failure and cardiac failure    Critical care was time spent personally by me on the following activities:  Ordering and performing treatments and interventions, ordering and review of laboratory studies, ordering and review of radiographic studies, pulse oximetry, re-evaluation of patient's condition, examination of patient, evaluation of patient's response to treatment, development of treatment plan with patient or surrogate and discussions with consultants         DATA     Results     Procedure Component Value Units Date/Time    Protime-INR [[id number]]  (Normal) Collected: 05/04/23 0746    Specimen: Blood, Venous Updated: 05/04/23 0845     PT 14.4 sec      INR 1.1     Comment: INR has no defined significance when PT is within Reference Range.       APTT [[id number]]  (Abnormal) Collected: 05/04/23 0746    Specimen: Blood, Venous Updated: 05/04/23 0845     PTT <22 sec      Comment: Result rechecked         HS Troponin (with 2 hour reflex) [[id number]]  (Abnormal) Collected: 05/04/23 0746    Specimen: Blood, Venous Updated: 05/04/23 0824     High Sens Troponin I 26.3 pg/mL      Comprehensive metabolic panel [[id number]]  (Abnormal) Collected: 05/04/23 0746    Specimen: Blood, Venous Updated: 05/04/23 0814     Sodium 139 mEQ/L      Potassium 5.7 mEQ/L      Comment: Results obtained on plasma. Plasma Potassium values may be up to 0.4 mEQ/L less than serum values. The differences may be greater for patients with high platelet or white cell counts.  MODERATE HEMOLYSIS, RESULT MAY BE INCREASED.        Chloride 110 mEQ/L      CO2 20 mEQ/L      BUN 38 mg/dL      Creatinine 2.0 mg/dL      Glucose 111 mg/dL      Calcium 8.6 mg/dL      AST (SGOT) 46 IU/L      Comment: MODERATE HEMOLYSIS, RESULT MAY BE INCREASED.        ALT (SGPT) 35 IU/L      Comment: MODERATE HEMOLYSIS, RESULT MAY BE INCREASED.        Alkaline Phosphatase 75 IU/L      Total Protein 6.3 g/dL      Comment: Test performed on plasma which typically contains approximately 0.4 g/dL more protein than serum.        Albumin 3.0 g/dL      Bilirubin, Total 2.0 mg/dL      Comment: MODERATE HEMOLYSIS, RESULT MAY BE INCREASED.        eGFR 23.3 mL/min/1.73m*2      Anion Gap 9 mEQ/L     Magnesium [[id number]]  (Normal) Collected: 05/04/23 0746    Specimen: Blood, Venous Updated: 05/04/23 0814     Magnesium 2.5 mg/dL      Comment: MODERATE HEMOLYSIS, RESULT MAY BE INCREASED.       CBC and differential [[id number]]  (Abnormal) Collected: 05/04/23 0746    Specimen: Blood, Venous Updated: 05/04/23 0800     WBC 14.88 K/uL      RBC 4.05 M/uL      Hemoglobin 12.5 g/dL      Hematocrit 40.1 %      MCV 99.0 fL      MCH 30.9 pg      MCHC 31.2 g/dL      RDW 15.1 %      Platelets 319 K/uL      MPV 10.9 fL      Differential Type Auto     nRBC 0.0 %      Immature Granulocytes 0.6 %      Neutrophils 79.5 %      Lymphocytes 11.0 %      Monocytes 8.0 %      Eosinophils 0.6 %      Basophils 0.3 %      Immature Granulocytes, Absolute 0.09 K/uL      Neutrophils, Absolute 11.83 K/uL      Lymphocytes, Absolute 1.64 K/uL      Monocytes, Absolute 1.19 K/uL      Eosinophils,  Absolute 0.09 K/uL      Basophils, Absolute 0.04 K/uL           Imaging Results          X-RAY CHEST 1 VIEW (Final result)  Result time 05/04/23 07:55:49    Final result                                  ECG 12 lead   Independent Interpretation by ED Provider   A-fib 170  Nonspecific ST-T wave abnormality          Scoring tools                                  ED Course & MDM   MDM / ED COURSE / CLINICAL IMPRESSION / DISPO     MDM    ED Course as of 05/04/23 1430   Thu May 04, 2023   0806 BP(!): 86/68  Hypotension likely related to sublingual nitroglycerin taken by patient at home.  IV fluid boluses ordered as well as Cardizem infusion for treatment of A-fib with RVR [JK]   0949 Heart Rate(!): 132  Improving on cardizem. No CP [JK]      ED Course User Index  [JK] Addy Gore MD     Clinical Impression      Chest pain, unspecified type   Atrial fibrillation, unspecified type (CMS/HCC)   Tachycardia     _________________     ED Disposition   Admit / Observation                   Addy Gore MD  05/04/23 1430

## 2023-05-04 NOTE — ASSESSMENT & PLAN NOTE
CHF with recovered LVEF. Previously 45% in 5/2022 thought to be 2/2 Takotsubo CM vs CAD. LVEF recovered to 55% on echo in 1/2023.  She takes 20 mg Lasix daily.  No hypoxia and lungs are clear on exam. Mild peripheral edema.  S/p 1L IV fluids for hypotension. Would hold off on further IV fluids due to concern for precipitating acute CHF in the setting of rapid afib.

## 2023-05-04 NOTE — CONSULTS
Cardiology Consult Note    HISTORY OF PRESENT ILLNESS      Gali Escalera is a 89 y.o. female who is referred for consultation on 5/4/2023 for chest pain and rapid afib. Yesterday evening she was not feeling well and was tired. At 3AM she developed shortness of breath and chest tightness as though she could not get a full breath in. These symptoms would come and go but she is not sure how long they lasted. She thinks the chest pain would ease after she took a deep breath. She took SL nitro x 3 doses and states she felt a little better so she went to sleep. Both of her legs felt weak. She called EMS around 6AM because her chest discomfort and shortness of breath had not fully gone away. With EMS, she was hypotensive so they gave her 100 mL IV fluid. In the ED she was found to be hypotensive, tachypneic and in rapid afib. BP improved with 1L IV fluid and HR remains rapid on 15 mg/hr Cardizem gtt. She denies any recent cough, fever, orthopnea, leg swelling. She did not take her metoprolol this AM and does not think she took it last night either. Her son is at bedside and helped with the history. Labs are notable for Cr 2.0 and K 5.7.    She is known to Dr. Yeboah ([facility]). She has a history of paroxysmal atrial fibrillation. Only documented occurrence of afib was in the setting of hospitalization for GI illness at which time her afib rates were difficult to control with medical therapy until she finally converted back to sinus rhythm. She was initially on Eliquis though this was discontinued due to lack of afib recurrence and fall risk. She had a hospitalization about a year ago for pneumonia, CHF, and NSTEMI (medically managed w/ ASA+Plavix, no cath). Her echo at the time showed LVEF 45% which was thought to be secondary to CAD vs Takotsubo CM. LVEF has recovered to 55%.    PAST MEDICAL / SURGICAL / SOCIAL / FAMILY HISTORY     Past Medical History:   Diagnosis Date   • Coronary artery disease     2014 cath showed  nonobstructive CAD with 50% LAD stenosis, 30% RCA stenosis   • Hyperlipidemia    • Mitral valve disorder    • Paroxysmal atrial fibrillation (CMS/HCC)    • Systolic CHF (CMS/HCC)     LVEF 45% in 5/2022. LVEF recovered to 55% 1/2023       Past Surgical History:   Procedure Laterality Date   • TOTAL HIP ARTHROPLASTY       Social History     Tobacco Use   • Smoking status: Former - quit >60 years ago     Types: Cigarettes   • Smokeless tobacco: Never   Vaping Use   • Vaping status: Never Used       Family History   Problem Relation Age of Onset   • Breast cancer Biological Mother        MEDICATIONS     Home Medications:  clopidogrel 75 mg tablet (clopidogrel) Take 1 tablet by mouth once a day   Bee Low Dose Aspirin 81 mg tablet,delayed release (DR/EC) (aspirin) Take 1 tablet by mouth once a day   Nitrostat 0.4 mg tablet, sublingual (nitroglycerin) place 1 tablet under the tongue if needed every 5 minutes for chest pain for 3 doses IF NO RELIEF AFTER THIRD DOSE CALL PRESCRIBER .  valsartan 40 mg tablet (valsartan) Take 1 tablet by mouth once a day   metoprolol succinate 100 mg tablet extended release 24 hr (metoprolol succinate) Take 1 tablet by mouth every twelve hours   isosorbide mononitrate 30 mg tablet extended release 24 hr (isosorbide mononitrate) Take 1 tablet by mouth once a day   furosemide 20 mg tablet (furosemide) Take 1 tablet by mouth once a day   atorvastatin 10 mg tablet (atorvastatin) TAKE 1 TABLET BY MOUTH EVERY DAY    Current Inpatient Medications:    • dilTIAZem  5-15 mg/hr 15 mg/hr (05/04/23 0943)       ALLERGIES     Allergies:  No known allergies    REVIEW OF SYSTEMS     Review of Systems:  Review of Systems   Constitutional: Positive for malaise/fatigue. Negative for chills and fever.   HENT: Negative for nosebleeds.    Cardiovascular: Positive for chest pain and dyspnea on exertion. Negative for leg swelling, near-syncope, orthopnea, palpitations and syncope.   Respiratory: Positive for  shortness of breath. Negative for cough.    Endocrine: Negative for polyuria.   Hematologic/Lymphatic: Does not bruise/bleed easily.   Skin: Negative for rash.   Musculoskeletal: Negative for falls.   Gastrointestinal: Negative for abdominal pain, hematochezia, melena, nausea and vomiting.   Genitourinary: Negative for hematuria.   Neurological: Positive for weakness.   Psychiatric/Behavioral: Negative for altered mental status.       PHYSICAL EXAMINATION     Vital Signs Last 24 Hours:  Temp:  [36.4 °C (97.5 °F)] 36.4 °C (97.5 °F)  Heart Rate:  [132-178] 145  Resp:  [19-25] 25  BP: ()/(47-90) 113/90    Intake/Output Summary (Last 24 hours) at 5/4/2023 1051  Last data filed at 5/4/2023 0846  Gross per 24 hour   Intake 1000 ml   Output --   Net 1000 ml       Physical Exam:  Physical Exam  Constitutional:       General: She is not in acute distress.     Appearance: She is well-developed.   HENT:      Head: Normocephalic and atraumatic.   Eyes:      General: No scleral icterus.     Conjunctiva/sclera: Conjunctivae normal.   Neck:      Thyroid: No thyromegaly.      Vascular: No JVD.   Cardiovascular:      Rate and Rhythm: Tachycardia present. Rhythm irregularly irregular.      Pulses: Intact distal pulses.      Heart sounds: Normal heart sounds. No murmur heard.     No friction rub. No gallop.   Pulmonary:      Effort: Tachypnea present. No accessory muscle usage or respiratory distress.      Breath sounds: Normal breath sounds. No wheezing, rhonchi or rales.   Abdominal:      General: Bowel sounds are normal.      Palpations: Abdomen is soft.   Musculoskeletal:         General: No tenderness. Normal range of motion.      Cervical back: Neck supple.      Right lower leg: Edema (trace) present.      Left lower leg: Edema (trace) present.   Skin:     General: Skin is warm and dry.   Neurological:      Mental Status: She is alert and oriented to person, place, and time.   Psychiatric:         Behavior: Behavior  normal.         LABS / IMAGING / ECG / TELEMETRY     Labs:  Results from last 7 days   Lab Units 05/04/23  0746   SODIUM mEQ/L 139   POTASSIUM mEQ/L 5.7*   CHLORIDE mEQ/L 110*   CO2 mEQ/L 20*   BUN mg/dL 38*   CREATININE mg/dL 2.0*   GLUCOSE mg/dL 111*   CALCIUM mg/dL 8.6*       Results from last 7 days   Lab Units 05/04/23  0746   AST IU/L 46*     Results from last 7 days   Lab Units 05/04/23  0746   ALT IU/L 35     Results from last 7 days   Lab Units 05/04/23  0746   WBC K/uL 14.88*   HEMOGLOBIN g/dL 12.5   HEMATOCRIT % 40.1   PLATELETS K/uL 319       Results from last 7 days   Lab Units 05/04/23  0948 05/04/23  0746   HIGH SENSITIVE TROPONIN I pg/mL 24.4* 26.3*     PT/PTT Results       05/04/23     0746    PT 14.4    INR 1.1    PTT <22         Comment for INR at 0746 on 05/04/23: INR has no defined significance when PT is within Reference Range.    Comment for PTT at 0746 on 05/04/23: Result rechecked          Lab Results   Component Value Date    CHOL 217 (H) 11/23/2020    TRIG 183 (H) 11/23/2020    HDL 51 (L) 11/23/2020    LDLCALC 129 (H) 11/23/2020    TSH 3.45 07/18/2016       Imaging:  X-RAY CHEST 1 VIEW    Result Date: 5/4/2023  IMPRESSION: Minimal retrocardiac opacity, may represent atelectasis, aspiration or pneumonia.     Cardiac Imaging    Echo 1/2023  Technically adequate study.    The left ventricular ejection fraction is 55%.    The left ventricular ejection fraction is normal.    This study was technically suboptimal for detailed regional wall motion assessment.   Mild left ventricular hypertrophy is present.    Grade 1 left ventricular diastolic dysfunction is present.    The left atrium is mildly dilated.    The ascending aorta is dilated.    The mitral leaflets are thickened without restriction of motion.    The mitral valve has mild regurgitation.    The aortic valve is sclerotic without restriction of motion.    The aortic valve has mild regurgitation.    The estimated systolic pulmonary artery  pressure is 40 mmHg.        ECG: atrial fibrillation with  bpm. LVH. Lateral ST/T abnormality. Afib replaced sinus on previous outpatient ECG.    Telemetry: atrial fibrillation 130-160s    Reviewed outpatient office visit, echo. Reviewed telemetry, labs, CXR.    ASSESSMENT AND PLAN     Paroxysmal atrial fibrillation (CMS/HCC)  Assessment & Plan  Previous episode in 2019 in the setting of acute GI illness with difficult to control rates. Has not been on AC for several years with lack of afib recurrence. Chronically on 100 mg metoprolol succinate BID.  CHADSVASc - 6 (CHF, HTN, age x2, female, CAD/PAD)    Rates continue to be rapid on 15 mg/hr Cardizem infusion.  Start amiodarone bolus/infusion  Start heparin gtt. Noted history of fall risk. Discussed with patient and son - she walks without walker/cane and gets around well generally. She has broken both of her hips with falls in the past 5 years. Will trial AC with heparin and continue discussion regarding long-term anticoagulation now with afib recurrence.    Chronic systolic CHF (congestive heart failure) (CMS/HCC)  Assessment & Plan  CHF with recovered LVEF. Previously 45% in 5/2022 thought to be 2/2 Takotsubo CM vs CAD. LVEF recovered to 55% on echo in 1/2023.  She takes 20 mg Lasix daily.  No hypoxia and lungs are clear on exam. Mild peripheral edema.  S/p 1L IV fluids for hypotension. Would hold off on further IV fluids due to concern for precipitating acute CHF in the setting of rapid afib.    * Myocardial injury  Assessment & Plan  HS troponin is trivially elevated and flat (26->24) in the setting of rapid atrial fibrillation. Suspect chest tightness is symptomatic atrial fibrillation, not an acute coronary event.    Coronary artery disease  Assessment & Plan  Nonobstructive CAD on cath in 2014. She was medically managed for an NSTEMI 5/2022 and has been on Aspirin and Plavix. Resume BB, Imdur as BP allows. Would not plan to resume ASA and Plavix if she  is to be on systemic anticoagulation for PAF.      Discussed with RN, [facility] attending, ED attending.    SONY Malone  5/4/2023

## 2023-05-04 NOTE — ASSESSMENT & PLAN NOTE
Previous episode in 2019 in the setting of acute GI illness with difficult to control rates. Has not been on AC for several years with lack of afib recurrence. Chronically on 100 mg metoprolol succinate BID.    Brief self limited event.  Holding on adding amiodarone as she is on high dose metoprolol BID and there is mild bradycardia in NSR.    Starting Eliquis 2.5mg BID and stopping DAPT.  She had tolerated DAPT from bleeding standopoint and no falls here in 6 months.  Also, no PCI history.    Plan DOAC monotherapy.

## 2023-05-04 NOTE — ASSESSMENT & PLAN NOTE
Nonobstructive CAD on cath in 2014. She was medically managed for an NSTEMI 5/2022 and has been on Aspirin and Plavix. No acute issue.

## 2023-05-04 NOTE — H&P
Internal Medicine  History & Physical        CHIEF COMPLAINT   Chest pain     HISTORY OF PRESENT ILLNESS      This is a 89 y.o. female with a past medical history of atrial fibrillation, HLD, CAD s/p 2 stents who presents with acute chest pain and shortness of breath.  She reports that overnight last night she developed worsening chest tightness and shortness of breath.  She reports that she took sublingual nitro x 3 doses and had some relief, but when it didn't improve, she called EMS.  Upon EMS arrival, patient was hypotensive, and received IVF en route.      Upon arrival to the ED, she was afebrile, tachycardic with  bpm, hypotensive with BP 86/68 mmHg and O2 Sat was 97% on RA.  She was initiated on a cardizem infusion and blood pressure improved after IV fluid resuscitation.      Labs were significant for K 5.7, Cr 2.0, Troponin 26 & 24. She had a leukocytosis with WBC 14.8.  CXR was significant for retrocardiac opacification which may represent atelectasis or pneumonia.      Patient reports that she continues to feel short of breath, and endorses years of shortness of breath prior to her presentation today.  She denies recent fevers, chills, nausea, vomiting, diarrhea, or change in PO intake.  She denies recent sick contacts.     PAST MEDICAL AND SURGICAL HISTORY      PMHx:  Past Medical History:   Diagnosis Date   • Coronary artery disease     2014 cath showed nonobstructive CAD with 50% LAD stenosis, 30% RCA stenosis   • Hyperlipidemia    • Mitral valve disorder    • Paroxysmal atrial fibrillation (CMS/HCC)    • Systolic CHF (CMS/HCC)     LVEF 45% in 5/2022. LVEF recovered to 55% 1/2023       PSHx:  Past Surgical History:   Procedure Laterality Date   • TOTAL HIP ARTHROPLASTY         PCP:   Timmy Archibald MD    MEDICATIONS      Prior to Admission medications    Medication Sig Start Date End Date Taking? Authorizing Provider   aspirin 81 mg enteric coated tablet Take 81 mg by mouth daily.   Yes  Provider, Tia Gupta MD   atorvastatin (LIPITOR) 10 mg tablet Take 10 mg by mouth daily. 7/25/17  Yes Alonso Springer MD   clopidogreL (PLAVIX) 75 mg tablet Take 75 mg by mouth daily. 3/15/23  Yes Tia Springer MD   furosemide (LASIX) 20 mg tablet Take 20 mg by mouth daily. 3/15/23  Yes Tia Springer MD   isosorbide mononitrate (IMDUR) 30 mg 24 hr tablet Take 30 mg by mouth daily. 3/9/23  Yes Tia Springer MD   metoprolol succinate XL (TOPROL-XL) 100 mg 24 hr tablet Take 100 mg by mouth every 12 (twelve) hours.   Yes Alonso Springer MD   valsartan (DIOVAN) 40 mg tablet Take 40 mg by mouth daily. 3/15/23  Yes Tia Springer MD   acetaminophen (TYLENOL) 500 mg tablet Take 500 mg by mouth every 4 (four) hours as needed for mild pain or headaches.    Tia Springre MD   nitroglycerin (NITROSTAT) 0.4 mg SL tablet Place 0.4 mg under the tongue every 5 (five) minutes as needed for chest pain.    Tia Springer MD   apixaban (ELIQUIS) 5 mg tablet Take 5 mg by mouth daily.  5/4/23  Alonso Springer MD   aspirin 81 mg enteric coated tablet Take 81 mg by mouth daily. 6/8/20 5/4/23  Alonso Springer MD   hydrochlorothiazide (HYDRODIURIL) 12.5 mg tablet Take 12.5 mg by mouth daily.  5/4/23  Alonso Springer MD   tretinoin (RETIN-A) 0.025 % cream APPLY TOPICALLY TO FACE AT BEDTIME 10/29/19 5/4/23  Alonso Springer MD       Home medications were personally reviewed.    ALLERGIES      No known allergies    FAMILY HISTORY      Family History   Problem Relation Age of Onset   • Breast cancer Biological Mother        SOCIAL HISTORY      Social History     Socioeconomic History   • Marital status:    Tobacco Use   • Smoking status: Former     Types: Cigarettes   • Smokeless tobacco: Never   Vaping Use   • Vaping status: Never Used     Social Determinants of Health     Food Insecurity: No Food Insecurity (5/4/2023)    Hunger  "Vital Sign    • Worried About Running Out of Food in the Last Year: Never true    • Ran Out of Food in the Last Year: Never true       REVIEW OF SYSTEMS      Review of Systems: Negative unless otherwise noted in H&P.     PHYSICAL EXAMINATION      Temp:  [36.4 °C (97.5 °F)-36.4 °C (97.6 °F)] 36.4 °C (97.6 °F)  Heart Rate:  [101-178] 103  Resp:  [19-34] 20  BP: ()/(47-97) 103/67  Body mass index is 26.96 kg/m².    PHYSICAL EXAM  Vitals: Visit Vitals  /67 (BP Location: Right upper arm, Patient Position: Lying)   Pulse (!) 103   Temp 36.4 °C (97.6 °F) (Oral)   Resp 20   Ht 1.651 m (5' 5\")   Wt 73.5 kg (162 lb)   SpO2 92%   BMI 26.96 kg/m²      General: NAD, appears stated age   HEENT: Oropharyx clear and without exudates  No JVD   Eyes: Conjunctiva clear   Cardiac: Tachycardic, irregularly irregular rhythm  No murmurs, rubs, or gallops   Pulmonary: Clear to auscultation bilaterally, upper airway expiratory wheeze   Abdomen: Soft, non-tender, +BS  No rebound, no guarding   MSK: No joint deformity   Extremities: trace peripheral edema   Neuro: AAO x 3, non-focal  CN II-XII intact   Psych: Appropriate, cooperative   Skin: Warm & dry              LABS / IMAGING / EKG        Labs  BMP:  Results from last 7 days   Lab Units 05/04/23  0746   SODIUM mEQ/L 139   POTASSIUM mEQ/L 5.7*   CHLORIDE mEQ/L 110*   CO2 mEQ/L 20*   BUN mg/dL 38*   CREATININE mg/dL 2.0*   GLUCOSE mg/dL 111*   CALCIUM mg/dL 8.6*       Results from last 7 days   Lab Units 05/04/23  0746   MAGNESIUM mg/dL 2.5       LFT:  Lab Results   Component Value Date    ALT 35 05/04/2023    AST 46 (H) 05/04/2023    ALKPHOS 75 05/04/2023    BILITOT 2.0 (H) 05/04/2023       CBC:  Results from last 7 days   Lab Units 05/04/23  0746   WBC K/uL 14.88*   HEMOGLOBIN g/dL 12.5   HEMATOCRIT % 40.1   PLATELETS K/uL 319   DIFF TYPE  Auto   NRBC % 0.0   IMM GRANULOCYTES % 0.6   NEUTROPHILS % 79.5   LYMPHOCYTES % 11.0   MONOCYTES % 8.0   EOSINOPHILS % 0.6   BASOPHILS % " 0.3   IMM GRANUCOCYTES ABS K/uL 0.09*   MONO ABS AUTO K/uL 1.19*   EOS ABS AUTO K/uL 0.09   BASO ABS AUTO K/uL 0.04           Imaging  I have independently reviewed the pertinent imaging from the last 24 hrs.    ECG/Telemetry  I have independently reviewed the telemetry. Significant findings include atrial fibrillation with RVR.    ASSESSMENT AND PLAN           Paroxysmal atrial fibrillation (CMS/[facility])  Assessment & Plan  - Patient presenting in Afib RVR with HR's in the 160's with associated hypotension  - Patient initiated on cardizem infusion and amiodarone infusion  - Heparin infusion initiated  - patient previously on Eliquis, but discontinued in the setting of several falls.  Will discuss long term plan for AC with cardiology  - Will continue metoprolol succ 100mg BID  - Cardiology consulted, appreciate recs    Chronic systolic CHF (congestive heart failure) (CMS/[facility])  Assessment & Plan  - Patient with history of HFrEF, now with recovered EF as of January 2023 (55%)  - Received IVF bolus in the ED, hypotension was likely secondary to rapid heart rate as well as vasodilatory effect from SL nitro  - Lasix 20mg at home, holding in the setting of borderline hypotension  - Continue metoprolol 100mg BID  - Home valsartan and Imdur held for hypotension, will restart as blood pressure improves.    Coronary artery disease  Assessment & Plan  - History of CAD s/p stent  - NSTEMI in 2022 on medical management with aspirin and plavix  - Holding aspirin and plavix per cardiology    * Myocardial injury  Assessment & Plan  - Troponin elevated to 26 in the setting of rapid heart rate, clinically insignificant         VTE Assessment: [place] VTE Score: 2  Code Status: Full Code  Estimated discharge date: 5/7/2023

## 2023-05-04 NOTE — ASSESSMENT & PLAN NOTE
HS troponin is trivially elevated and flat (26->24) in the setting of rapid atrial fibrillation. Suspect chest tightness is symptomatic atrial fibrillation, not an acute coronary event.

## 2023-05-05 ENCOUNTER — APPOINTMENT (INPATIENT)
Dept: RADIOLOGY | Facility: HOSPITAL | Age: 88
DRG: 309 | End: 2023-05-05
Attending: HOSPITALIST
Payer: MEDICARE

## 2023-05-05 VITALS
HEIGHT: 65 IN | HEART RATE: 64 BPM | OXYGEN SATURATION: 94 % | TEMPERATURE: 97.4 F | RESPIRATION RATE: 19 BRPM | BODY MASS INDEX: 26.99 KG/M2 | SYSTOLIC BLOOD PRESSURE: 158 MMHG | DIASTOLIC BLOOD PRESSURE: 73 MMHG | WEIGHT: 162 LBS

## 2023-05-05 LAB
ALBUMIN SERPL-MCNC: 3 G/DL (ref 3.4–5)
ALP SERPL-CCNC: 76 IU/L (ref 35–126)
ALT SERPL-CCNC: 58 IU/L (ref 11–54)
ANION GAP SERPL CALC-SCNC: 9 MEQ/L (ref 3–15)
APTT PPP: 82 SEC (ref 23–35)
AST SERPL-CCNC: 49 IU/L (ref 15–41)
ATRIAL RATE: 48
BASOPHILS # BLD: 0.04 K/UL (ref 0.01–0.1)
BASOPHILS NFR BLD: 0.3 %
BILIRUB SERPL-MCNC: 1.3 MG/DL (ref 0.3–1.2)
BUN SERPL-MCNC: 44 MG/DL (ref 8–20)
CALCIUM SERPL-MCNC: 8.8 MG/DL (ref 8.9–10.3)
CHLORIDE SERPL-SCNC: 109 MEQ/L (ref 98–109)
CO2 SERPL-SCNC: 22 MEQ/L (ref 22–32)
CREAT SERPL-MCNC: 2 MG/DL (ref 0.6–1.1)
DIFFERENTIAL METHOD BLD: ABNORMAL
EOSINOPHIL # BLD: 0.23 K/UL (ref 0.04–0.36)
EOSINOPHIL NFR BLD: 1.8 %
ERYTHROCYTE [DISTWIDTH] IN BLOOD BY AUTOMATED COUNT: 15.1 % (ref 11.7–14.4)
GFR SERPL CREATININE-BSD FRML MDRD: 23.3 ML/MIN/1.73M*2
GLUCOSE BLD-MCNC: 104 MG/DL (ref 70–99)
GLUCOSE SERPL-MCNC: 114 MG/DL (ref 70–99)
HCT VFR BLDCO AUTO: 38.4 % (ref 35–45)
HGB BLD-MCNC: 12 G/DL (ref 11.8–15.7)
IMM GRANULOCYTES # BLD AUTO: 0.06 K/UL (ref 0–0.08)
IMM GRANULOCYTES NFR BLD AUTO: 0.5 %
LYMPHOCYTES # BLD: 1.83 K/UL (ref 1.2–3.5)
LYMPHOCYTES NFR BLD: 14.3 %
MAGNESIUM SERPL-MCNC: 2.4 MG/DL (ref 1.8–2.5)
MCH RBC QN AUTO: 30.5 PG (ref 28–33.2)
MCHC RBC AUTO-ENTMCNC: 31.3 G/DL (ref 32.2–35.5)
MCV RBC AUTO: 97.5 FL (ref 83–98)
MONOCYTES # BLD: 1.25 K/UL (ref 0.28–0.8)
MONOCYTES NFR BLD: 9.8 %
NEUTROPHILS # BLD: 9.36 K/UL (ref 1.7–7)
NEUTS SEG NFR BLD: 73.3 %
NRBC BLD-RTO: 0 %
P AXIS: 51
PDW BLD AUTO: 10.9 FL (ref 9.4–12.3)
PHOSPHATE SERPL-MCNC: 4.6 MG/DL (ref 2.4–4.7)
PLATELET # BLD AUTO: 345 K/UL (ref 150–369)
POCT TEST: ABNORMAL
POTASSIUM SERPL-SCNC: 5.1 MEQ/L (ref 3.6–5.1)
PR INTERVAL: 154
PROT SERPL-MCNC: 6 G/DL (ref 6–8.2)
QRS DURATION: 108
QT INTERVAL: 504
QTC CALCULATION(BAZETT): 450
R AXIS: -29
RBC # BLD AUTO: 3.94 M/UL (ref 3.93–5.22)
SODIUM SERPL-SCNC: 140 MEQ/L (ref 136–144)
T WAVE AXIS: 180
TROPONIN I SERPL HS-MCNC: 24.5 PG/ML
VENTRICULAR RATE: 48
WBC # BLD AUTO: 12.77 K/UL (ref 3.8–10.5)

## 2023-05-05 PROCEDURE — 63700000 HC SELF-ADMINISTRABLE DRUG: Performed by: INTERNAL MEDICINE

## 2023-05-05 PROCEDURE — 83735 ASSAY OF MAGNESIUM: CPT | Performed by: STUDENT IN AN ORGANIZED HEALTH CARE EDUCATION/TRAINING PROGRAM

## 2023-05-05 PROCEDURE — 36415 COLL VENOUS BLD VENIPUNCTURE: CPT | Performed by: STUDENT IN AN ORGANIZED HEALTH CARE EDUCATION/TRAINING PROGRAM

## 2023-05-05 PROCEDURE — 85730 THROMBOPLASTIN TIME PARTIAL: CPT | Performed by: STUDENT IN AN ORGANIZED HEALTH CARE EDUCATION/TRAINING PROGRAM

## 2023-05-05 PROCEDURE — 84100 ASSAY OF PHOSPHORUS: CPT | Performed by: STUDENT IN AN ORGANIZED HEALTH CARE EDUCATION/TRAINING PROGRAM

## 2023-05-05 PROCEDURE — 71045 X-RAY EXAM CHEST 1 VIEW: CPT

## 2023-05-05 PROCEDURE — 93005 ELECTROCARDIOGRAM TRACING: CPT | Performed by: STUDENT IN AN ORGANIZED HEALTH CARE EDUCATION/TRAINING PROGRAM

## 2023-05-05 PROCEDURE — 63700000 HC SELF-ADMINISTRABLE DRUG: Performed by: STUDENT IN AN ORGANIZED HEALTH CARE EDUCATION/TRAINING PROGRAM

## 2023-05-05 PROCEDURE — 63700000 HC SELF-ADMINISTRABLE DRUG: Performed by: HOSPITALIST

## 2023-05-05 PROCEDURE — 99291 CRITICAL CARE FIRST HOUR: CPT | Performed by: HOSPITALIST

## 2023-05-05 PROCEDURE — 25800000 HC PHARMACY IV SOLUTIONS: Performed by: STUDENT IN AN ORGANIZED HEALTH CARE EDUCATION/TRAINING PROGRAM

## 2023-05-05 PROCEDURE — 84484 ASSAY OF TROPONIN QUANT: CPT | Performed by: HOSPITALIST

## 2023-05-05 PROCEDURE — 93010 ELECTROCARDIOGRAM REPORT: CPT | Performed by: INTERNAL MEDICINE

## 2023-05-05 PROCEDURE — 80053 COMPREHEN METABOLIC PANEL: CPT | Performed by: STUDENT IN AN ORGANIZED HEALTH CARE EDUCATION/TRAINING PROGRAM

## 2023-05-05 PROCEDURE — 85025 COMPLETE CBC W/AUTO DIFF WBC: CPT | Performed by: STUDENT IN AN ORGANIZED HEALTH CARE EDUCATION/TRAINING PROGRAM

## 2023-05-05 PROCEDURE — 63600000 HC DRUGS/DETAIL CODE: Performed by: STUDENT IN AN ORGANIZED HEALTH CARE EDUCATION/TRAINING PROGRAM

## 2023-05-05 PROCEDURE — 99239 HOSP IP/OBS DSCHRG MGMT >30: CPT | Performed by: STUDENT IN AN ORGANIZED HEALTH CARE EDUCATION/TRAINING PROGRAM

## 2023-05-05 RX ORDER — FUROSEMIDE 20 MG/1
20 TABLET ORAL ONCE
Status: COMPLETED | OUTPATIENT
Start: 2023-05-05 | End: 2023-05-05

## 2023-05-05 RX ADMIN — AMIODARONE HYDROCHLORIDE 0.5 MG/MIN: 50 INJECTION, SOLUTION INTRAVENOUS at 00:46

## 2023-05-05 RX ADMIN — FUROSEMIDE 20 MG: 20 TABLET ORAL at 05:57

## 2023-05-05 RX ADMIN — APIXABAN 2.5 MG: 2.5 TABLET, FILM COATED ORAL at 08:07

## 2023-05-05 RX ADMIN — METOPROLOL SUCCINATE ER TABLETS 100 MG: 100 TABLET, FILM COATED, EXTENDED RELEASE ORAL at 08:07

## 2023-05-05 ASSESSMENT — COGNITIVE AND FUNCTIONAL STATUS - GENERAL
STANDING UP FROM CHAIR USING ARMS: 3 - A LITTLE
WALKING IN HOSPITAL ROOM: 3 - A LITTLE
CLIMB 3 TO 5 STEPS WITH RAILING: 3 - A LITTLE
MOVING TO AND FROM BED TO CHAIR: 3 - A LITTLE

## 2023-05-05 NOTE — ASSESSMENT & PLAN NOTE
- Patient with history of HFrEF, now with recovered EF as of January 2023 (55%)  - Received IVF bolus in the ED, hypotension was likely secondary to rapid heart rate as well as vasodilatory effect from SL nitro  - Lasix 20mg at home, holding in the setting of borderline hypotension  - Continue metoprolol 100mg BID  - Home valsartan and Imdur held for hypotension, will restart as blood pressure improves.

## 2023-05-05 NOTE — NURSING NOTE
"Pt was apparently told by a physician this morning that she would be going home.  This became an obsessive fixation for the patient after this.  She told the RN that she had called her son in [place] who is flying into the area to pick her up.     She states that she will sign out AMA. Dr. Allred was at the bedside and explained why the patient should stay and the risks involved. Addison CIFUENTES also explained why should stay.  Pt verbalizes understanding and says she \"will treat her son this way\" and she will go. Pt got herself dressed and took off her cardiac monitor and will wait for her son to come pick her up.  "

## 2023-05-05 NOTE — SUBJECTIVE & OBJECTIVE
Subjective     Interval History: none.  Converted to NSR.  No bleeding on the IV heparin here.  She is AA and has no complaints.       Objective     Vital signs in last 24 hours:  Temp:  [36.4 °C (97.5 °F)-36.5 °C (97.7 °F)] 36.5 °C (97.7 °F)  Heart Rate:  [] 59  Resp:  [19-34] 20  BP: ()/(47-97) 117/88      Intake/Output Summary (Last 24 hours) at 5/5/2023 0771  Last data filed at 5/4/2023 0846  Gross per 24 hour   Intake 1000 ml   Output --   Net 1000 ml     Intake/Output this shift:  No intake/output data recorded.    Labs  I have reviewed the patient's labs.  Significant abnormals are BUN 44 creatinine 2.0.    Imaging  I have independently reviewed the pertinent imaging from the last 24 hrs.    Telemetry  sinus rhythm    VTE Assessment: I have reassessed and the patient's VTE risk and treatment plan is appropriate.    Physical Exam:  General appearance: alert, appears stated age, no distress, and cooperative  Lungs: clear to auscultation bilaterally  Heart: regular rate and rhythm  Abdomen: soft, non-tender; bowel sounds normal; no masses, no organomegaly  Extremities: no edema, redness or tenderness in the calves or thighs  Neurologic: Alert and oriented X 3  Normal strength and tone

## 2023-05-05 NOTE — PROGRESS NOTES
Cardiology Daily Progress Note    Subjective/Objective:  Subjective    Interval History: none.  Converted to NSR.  No bleeding on the IV heparin here.  She is AA and has no complaints.       Objective    Vital signs in last 24 hours:  Temp:  [36.4 °C (97.5 °F)-36.5 °C (97.7 °F)] 36.5 °C (97.7 °F)  Heart Rate:  [] 59  Resp:  [19-34] 20  BP: ()/(47-97) 117/88      Intake/Output Summary (Last 24 hours) at 5/5/2023 0764  Last data filed at 5/4/2023 0846  Gross per 24 hour   Intake 1000 ml   Output --   Net 1000 ml     Intake/Output this shift:  No intake/output data recorded.    Labs  I have reviewed the patient's labs.  Significant abnormals are BUN 44 creatinine 2.0.    Imaging  I have independently reviewed the pertinent imaging from the last 24 hrs.    Telemetry  sinus rhythm    VTE Assessment: I have reassessed and the patient's VTE risk and treatment plan is appropriate.    Physical Exam:  General appearance: alert, appears stated age, no distress, and cooperative  Lungs: clear to auscultation bilaterally  Heart: regular rate and rhythm  Abdomen: soft, non-tender; bowel sounds normal; no masses, no organomegaly  Extremities: no edema, redness or tenderness in the calves or thighs  Neurologic: Alert and oriented X 3  Normal strength and tone          Assessment/Plan   Paroxysmal atrial fibrillation (CMS/HCC)  Assessment & Plan  Previous episode in 2019 in the setting of acute GI illness with difficult to control rates. Has not been on AC for several years with lack of afib recurrence. Chronically on 100 mg metoprolol succinate BID.    Brief self limited event.  Holding on adding amiodarone as she is on high dose metoprolol BID and there is mild bradycardia in NSR.    Starting Eliquis 2.5mg BID and stopping DAPT.  She had tolerated DAPT from bleeding standopoint and no falls here in 6 months.  Also, no PCI history.    Plan DOAC monotherapy.     Coronary artery disease  Assessment & Plan  Nonobstructive  CAD on cath in 2014. She was medically managed for an NSTEMI 5/2022 and has been on Aspirin and Plavix. No acute issue.              Expected Discharge Date:  5/7/2023

## 2023-05-05 NOTE — DISCHARGE SUMMARY
Hospital Medicine Service -  Inpatient Discharge Summary        BRIEF OVERVIEW   Admitting Provider: Daphney Chang DO  Attending Provider: Eddie Allred MD Attending phys phone: [phone]    PCP: Timmy Archibald [phone]    Admission Date: 5/4/2023  Discharge Date: 5/5/2023     DISCHARGE DIAGNOSES      Primary Discharge Diagnosis  Myocardial injury    Secondary Discharge Diagnoses  Active Hospital Problems    Diagnosis Date Noted   • Myocardial injury 05/04/2023   • Chronic systolic CHF (congestive heart failure) (CMS/[facility]) 05/04/2023   • Paroxysmal atrial fibrillation (CMS/[facility]) 11/22/2019   • Coronary artery disease 11/05/2015      Resolved Hospital Problems   No resolved problems to display.         SUMMARY OF HOSPITALIZATION      Presenting Problem/History of Present Illness  This is a 89 y.o. year-old female admitted on 5/4/2023 with Tachycardia [R00.0]  Atrial fibrillation, unspecified type (CMS/HCC) [I48.91]  Chest pain, unspecified type [R07.9].      Hospital Course    This is a 89 y.o. female with a past medical history of atrial fibrillation, HLD, CAD s/p 2 stents who presented with acute chest pain and shortness of breath.  She reports that overnight she developed worsening chest tightness and shortness of breath.  She reports that she took sublingual nitro x 3 doses and had some relief, but when it didn't improve, she called EMS.  Upon EMS arrival, patient was hypotensive, and received IVF en route.       Upon arrival to the ED, she was afebrile, tachycardic with  bpm, hypotensive with BP 86/68 mmHg and O2 Sat was 97% on RA.  She was initiated on a cardizem infusion and blood pressure improved after IV fluid resuscitation.       Labs were significant for K 5.7, Cr 2.0, Troponin 26 & 24. She had a leukocytosis with WBC 14.8.  CXR was significant for retrocardiac opacification which may represent atelectasis or pneumonia.         She was admitted inpatient for paroxysmal  atrial fibrillation with RVR and acute kidney injury most likely secondary to dehydration.  Cardiology was on board and the patient was continued initially on Cardizem infusion and amiodarone infusion and converted back to normal sinus rhythm .  She was also initiated on heparin infusion, however later on it was changed to Eliquis and she was advised to stop aspirin and Plavix.    Due to her renal injury her valsartan was withheld.  She is currently medically stable to be discharged home and needs to follow-up with her cardiologist and primary care physician as an outpatient.  She needs to repeat BMP in 4 days to check the renal functions and follow-up with the primary care physician regarding resumption of valsartan.    Exam on Day of Discharge    General: No apparent distress, appears stated age    HNT: normocephalic, atraumatic, moist mucous membranes    Eyes: Sclera anicteric, EOMI, PERRL    Respiratory : Clear to auscultation bilaterally, no added sounds    CVS: No JVD, S1 S2 heard, no murmurs, pulse regular rate and rhythm    Abdomen: Soft, non-tender, non-distended, no evidence of free fluid appreciated, bowel sounds noted    Genitourinary: No CVA tenderness, no indwelling catheter - voiding freely    Extremities: Pulses 2+ bilaterally, No edema/cyanosis    Neurologic: Alert/awake/oriented X 3, following verbal commands, grossly no focal neurological deficits appreciated    Consults During Admission  IP CONSULT TO CARDIOLOGY    DISCHARGE MEDICATIONS               Medication List      START taking these medications    apixaban 2.5 mg tablet  Commonly known as: ELIQUIS  Take 1 tablet (2.5 mg total) by mouth 2 (two) times a day Indications: treatment to prevent blood clots in chronic atrial fibrillation.  Dose: 2.5 mg        CONTINUE taking these medications    acetaminophen 500 mg tablet  Commonly known as: TYLENOL  Take 500 mg by mouth every 4 (four) hours as needed for mild pain or headaches.  Dose: 500 mg      atorvastatin 10 mg tablet  Commonly known as: LIPITOR  Take 10 mg by mouth daily.  Dose: 10 mg     furosemide 20 mg tablet  Commonly known as: LASIX  Take 20 mg by mouth daily.  Dose: 20 mg     isosorbide mononitrate 30 mg 24 hr tablet  Commonly known as: IMDUR  Take 30 mg by mouth daily.  Dose: 30 mg     metoprolol succinate  mg 24 hr tablet  Commonly known as: TOPROL-XL  Take 100 mg by mouth every 12 (twelve) hours.  Dose: 100 mg     nitroglycerin 0.4 mg SL tablet  Commonly known as: NITROSTAT  Place 0.4 mg under the tongue every 5 (five) minutes as needed for chest pain.  Dose: 0.4 mg        STOP taking these medications    aspirin 81 mg enteric coated tablet     clopidogreL 75 mg tablet  Commonly known as: PLAVIX     valsartan 40 mg tablet  Commonly known as: DIOVAN                  PROCEDURES / LABS / IMAGING      Procedures      Pertinent Labs  Pertinent radiology and labs reviewed       SARS-CoV-2 (COVID-19) (no units)   Date/Time Value   05/20/2022 1203 Negative       Pertinent Imaging  X-RAY CHEST 1 VIEW    Result Date: 5/5/2023  IMPRESSION: No significant change.    X-RAY CHEST 1 VIEW    Result Date: 5/4/2023  IMPRESSION: Minimal retrocardiac opacity, may represent atelectasis, aspiration or pneumonia.       OUTPATIENT  FOLLOW-UP / REFERRALS / PENDING TESTS        Outpatient Follow-Up Appointments  Encounter Information    This patient does not currently have any appointments scheduled.         Referrals  No orders of the defined types were placed in this encounter.      Test Results Pending at Discharge  Unresulted Labs (From admission, onward)     Start     Ordered    05/06/23 0600  PTT  STAT        Question:  Release to patient  Answer:  Immediate    05/05/23 0427    05/04/23 1237  Blood Culture Blood, Venous  (Blood Cultures X 2, Peripheral)  Once        Question:  Release to patient  Answer:  Immediate   See Hyperspace for full Linked Orders Report.    05/04/23 1236    05/04/23 1237  Blood  Culture Blood, Venous  (Blood Cultures X 2, Peripheral)  Once        Comments: From a different site than #1.     Question:  Release to patient  Answer:  Immediate   See Hyperspace for full Linked Orders Report.    05/04/23 1236    05/04/23 1237  Urine culture Urine, Clean Catch  Once        Question:  Release to patient  Answer:  Immediate    05/04/23 1236    05/04/23 1237  Urinalysis hold for Urine Culture  Once        Question:  Release to patient  Answer:  Immediate    05/04/23 1236    05/04/23 1237  UA Hold for UC (Macro)  PROCEDURE ONCE        Question:  Release to patient  Answer:  Immediate    05/04/23 1236                Important Issues to Address in Follow-Up    Follow-up with cardiology and your primary care physician as an outpatient.  Please repeat your renal function test in 4 days-  DISCHARGE DISPOSITION AND DESTINATION      Disposition: Home   Destination: Home                          DC planning took 35 minutes-  Code Status At Discharge: Full Code    Physician Order for Life-Sustaining Treatment Document Status      No documents found

## 2023-05-05 NOTE — DISCHARGE INSTRUCTIONS
You were initially admitted with an abnormal heart rhythm called atrial fibrillation.  Your heart rhythm converted back to normal rhythm after the medications.    You were also found to have acute renal injury and your creatinine was noted to be 2.  Your medication valsartan is being held due to your renal injury.  Please repeat your renal function test in 1 week and follow-up with your primary care physician as an outpatient.    You have also been started on a blood thinner because of your abnormal heart rhythm.  Please stop taking it if you notice any signs of bleeding and follow-up with your primary care physician and cardiology regarding continuation of the medication.

## 2023-05-05 NOTE — PLAN OF CARE
Problem: Adult Inpatient Plan of Care  Goal: Patient-Specific Goal (Individualized)  Flowsheets (Taken 5/5/2023 0406)  Patient-Specific Goals (Include Timeframe): To convert back to SR  Individualized Care Needs: Heparin, amiodarone, and cardizem drip  Anxieties, Fears or Concerns: None verbalized.   Plan of Care Review  Plan of Care Reviewed With: patient  Progress: no change  Outcome Summary: Pt AOx3. 4L humidifed O2. 1500 ml fluid restriction. Ax1 to BR. Heparin, amnio, and cardizem drip.

## 2023-05-05 NOTE — PLAN OF CARE
Care Coordination Admission Assessment Note  Date: 5/5/2023   Time: 1:11 PM    Patient Name: Gali Escalera  Medical Record Number: [id number]  YOB: 1929  Room/BED: 4504D    General Information  Patient-Specific Goals (Include Timeframe)  to go home today  PCP: Timmy Archibald MD   Insurance Coverage: MEDICARE  Readmission Within the last 30 days  no previous admission in last 30 days    Advance Directives (for Healthcare)?  Does patient have advance directive?: Yes  Patient does not have Advance Directive: Patient/Family needs assistance with Advance Directive: RN to contact Attending Provider for Palliative Care Consult Order  Does patient have current OOH DNR form?: No  Patient does not have current OOH DNR form: Patient/Family needs assistance with OOH DNR form: RN to contact Attending Provider for Palliative Care Consult Order  Does patient have current POLST?: No  Patient does not have current POLST: Patient/Family needs assistance with POLST: RN to contact Attending Provider for Palliative Care Consult Order  Does patient have mental health advance directive?: No  Patient does not have Mental Health Advance Directive: Patient/Family needs assitance with Mental Health Advance Directive     Living Arrangements  Current Living Arrangements: home  People in Home: alone  Living Arrangement Comments: Met at bedside w/ patient to discuss discharge plan; PCP and pharmacy confirmed. Patient resides alone at [facility] single story w/ basement home. No steps to enter; reports she does not access basement  Able to Return to Prior Arrangements: yes    Housing Stability and Financial Resources (SDOH)  In the last 12 months, was there a time when you were not able to pay the mortgage or rent on time?: No  In the last 12 months, how many places have you lived?: 1  In the last 12 months, was there a time when you did not have a steady place to sleep or slept in a shelter (including now)?: No  How hard is  it for you to pay for the very basics like food, housing, medical care, and heating?: Not hard at all    Current Outpatient/Agency/Support Group   (none)    Type of Current Home Care Services   (has use [facility] in the past; reports she will use their services again)  [facility]  accepted patient for services  Assistive Device/Animal Currently Used at Home  blood pressure cuff, grab bar, walker, front-wheeled    Functional Status Comments  Patient reports indepenent w/ ADL's; manages her medications w/ use of a pill box. Has a walker if needed    IADL Comments  Reports independent w/ IADL's; drives    Employment/ Status  Employment/ Comments: does not receive  benefits    Concerns to be Addressed  care coordination/care conferences, discharge planning    Current Discharge Risk  chronically ill, lives alone    Transportation Concerns (SDOH)  Transportation Anticipated: family or friend will provide  In the past 12 months, has lack of transportation kept you from medical appointments or from getting medications?: No  In the past 12 months, has lack of transportation kept you from meetings, work, or from getting things needed for daily living?: No    Concerns Comments  Currently requiring supplemental oxygen. Referral made to [facility] (RN/PT). Patient reports her son Corby is flying in from [place] and will be staying with her.    Anticipated Discharge Plan   Anticipated Discharge Disposition: home with home health  Type of Home Care Services: nursing, home PT  Patient/Family Anticipates Transition to: home with family  Patient/Family Anticipated Services at Transition: home health care  Met with patient. Provided education and contact information for Care Coordination services.: yes  Screening complete: yes

## 2023-05-05 NOTE — PLAN OF CARE
Care Coordination Discharge Plan Summary   Date: 5/5/2023   Time: 4:31 PM    Patient Name: Gali Escalera  Medical Record Number: [id number]  YOB: 1929  Room/BED: 4504D    General Information  Patient-Specific Goals (Include Timeframe)  to go home today  PCP: Timmy Archibald MD   Insurance Coverage: MEDICARE  Readmission Within the last 30 days  no previous admission in last 30 days    Advance Directives (for Healthcare)?  Does patient have advance directive?: Yes  Patient does not have Advance Directive: Patient/Family needs assistance with Advance Directive: RN to contact Attending Provider for Palliative Care Consult Order  Does patient have current OOH DNR form?: No  Patient does not have current OOH DNR form: Patient/Family needs assistance with OOH DNR form: RN to contact Attending Provider for Palliative Care Consult Order  Does patient have current POLST?: No  Patient does not have current POLST: Patient/Family needs assistance with POLST: RN to contact Attending Provider for Palliative Care Consult Order  Does patient have mental health advance directive?: No  Patient does not have Mental Health Advance Directive: Patient/Family needs assitance with Mental Health Advance Directive    Living Arrangements  Current Living Arrangements: home  People in Home: alone  Living Arrangement Comments: Met at bedside w/ patient to discuss discharge plan; PCP and pharmacy confirmed. Patient resides alone at [facility] single story w/ basement home. No steps to enter; reports she does not access basement  Able to Return to Prior Arrangements: yes    Housing Stability and Financial Resources (SDOH)  In the last 12 months, was there a time when you were not able to pay the mortgage or rent on time?: No  In the last 12 months, how many places have you lived?: 1  In the last 12 months, was there a time when you did not have a steady place to sleep or slept in a shelter (including now)?: No  How hard is it  for you to pay for the very basics like food, housing, medical care, and heating?: Not hard at all    Current Outpatient/Agency/Support Group   (none)    Type of Current Home Care Services   (has use [facility] in the past; reports she will use their services again)    Assistive Device/Animal Currently Used at Home  blood pressure cuff, grab bar, walker, front-wheeled    Functional Status Comments  Patient reports indepenent w/ ADL's; manages her medications w/ use of a pill box. Has a walker if needed    IADL Comments  Reports independent w/ IADL's; drives    Employment/ Status  Employment/ Comments: does not receive  benefits    Concerns to be Addressed  care coordination/care conferences, discharge planning    Current Discharge Risk  chronically ill, lives alone    Transportation Concerns (SDOH)  Transportation Anticipated: family or friend will provide  In the past 12 months, has lack of transportation kept you from medical appointments or from getting medications?: No  In the past 12 months, has lack of transportation kept you from meetings, work, or from getting things needed for daily living?: No    Concerns Comments  Currently requiring supplemental oxygen. Referral made to [facility] (RN/PT). Patient reports her son Corby is flying in from [place] and will be staying with her.    Anticipated Discharge Plan   Anticipated Discharge Disposition: home with home health  Type of Home Care Services: nursing  Patient/Family Anticipates Transition to: home with family (reports her son will be staying w/ her)  Patient/Family Anticipated Services at Transition: home health care  Met with patient. Provided education and contact information for Care Coordination services.: yes  Screening complete: yes    Discharge Assessment  Current Discharge Risk: chronically ill, lives alone  Concerns to be Addressed: care coordination/care conferences, discharge planning    Concerns Comments: Currently  requiring supplemental oxygen. Referral made to [facility] (RN/PT). Patient reports her son Corby is flying in from [place] and will be staying with her.    Patient Choice  Offered/Gave Vendor List: yes  Patient's Choice of Community Agency(s): [facility]     Patient and/or patient guardian/advocate was made aware of their right to choose a provider. A list of eligible providers was presented and reviewed with the patient and/or patient guardian/advocate in written and/or verbal form. The list delineates providers in the patient’s desired geographic area who are participating in the Medicare program and/or providers contracted with the patient’s primary insurance. The Medicare list and quality ratings were obtained from the Medicare.gov [medicare.gov] website.    Discharge Transportation   Does the patient need discharge transport arranged?: No    Discharge Barriers   Barriers to Discharge: None  Comment: Discharge ordered. Per attending no oxygen needed; no home O2 eval ordred.  Participants: nursing, physical therapy,   Can patient participate in a follow-up phone call/video visit?: Yes, independently

## 2023-05-05 NOTE — ASSESSMENT & PLAN NOTE
- Patient presenting in Afib RVR with HR's in the 160's with associated hypotension  - Patient initiated on cardizem infusion and amiodarone infusion  - Heparin infusion initiated  - patient previously on Eliquis, but discontinued in the setting of several falls.  Will discuss long term plan for AC with cardiology  - Will continue metoprolol succ 100mg BID  - Cardiology consulted, appreciate recs

## 2023-05-05 NOTE — PROGRESS NOTES
.     Hospital Medicine  RRT  Note       SUMMARY OF EVENT   This is a 89 y.o. year-old female who was admitted on 5/4/2023 with  Tachycardia [R00.0]  Atrial fibrillation, unspecified type (CMS/HCC) [I48.91]  Chest pain, unspecified type [R07.9] and is currently being treated for Myocardial injury.     I was called to the room by overhead page for RRT secondary to change in vital signs, specifically sinus pause and increasing oxygen requirements.    Hospital course briefly reviewed.  Mrs. Gali Escalera a 89-year-old female who was admitted to the hospital yesterday with atrial fibrillation with rapid ventricular response with associated hypotension.    Overnight patient was maintained on IV amiodarone infusion as well as Cardizem infusion.  She had been tachycardic between 110 and 120 all evening when she had a sinus pause and converted to normal sinus rhythm.     Upon my arrival heart rates were in the 40s and regular with increasing oxygen requirements up to 6 L/min.  Patient is awake and alert and is without complaint.  The amiodarone and the Cardizem were held.  X-ray showed sinus bradycardia without ST segment elevation.  And stat chest x-ray really did not look significantly changed. Patient reports that she normally takes 20 mg of furosemide every day.       CODE STATUS      Their Code Status at the start of the event was Full Code      CODE LEADER   Harley Hernandez, DO       PERTINENT PHYSICAL EXAMINATION      Pre-Event Vital Signs: Temp:  [36.4 °C (97.5 °F)-36.5 °C (97.7 °F)] 36.5 °C (97.7 °F)  Heart Rate:  [] 96  Resp:  [19-34] 20  BP: ()/(47-97) 117/88    Physical Exam   Age appearing female.  She does not appear to be in acute distress while she is resting in bed  EOMI  No significant JVD  She is able to speak in complete sentences.  Lungs are mildly diminished both bases otherwise I hear good breath sounds.  Heart rate is bradycardic in the mid 40s without rub.  Abdomen is soft  Trace edema with  good warm distal extremities  Oriented x3 answering questions appropriately  Following all commands     SIGNIFICANT LABS / IMAGING      Labs  I have reviewed the patient's pertinent labs. Pertinent labs are within normal limits.  BMP Results       05/05/23 05/04/23 11/23/20     0352 0746 0839     139 140    K 5.1 5.7 4.4    Cl 109 110 107    CO2 22 20 20    Glucose 114 111 91    BUN 44 38 27    Creatinine 2.0 2.0 1.1    Calcium 8.8 8.6 9.7    Anion Gap 9 9 13    EGFR 23.3 23.3 46.7         Comment for K at 0746 on 05/04/23: Results obtained on plasma. Plasma Potassium values may be up to 0.4 mEQ/L less than serum values. The differences may be greater for patients with high platelet or white cell counts.  MODERATE HEMOLYSIS, RESULT MAY BE INCREASED.            CBC Results       05/05/23 05/04/23 11/23/20     0352 0746 0839    WBC 12.77 14.88 8.41    RBC 3.94 4.05 4.85    HGB 12.0 12.5 14.8    HCT 38.4 40.1 45.3    MCV 97.5 99.0 93.4    MCH 30.5 30.9 30.5    MCHC 31.3 31.2 32.7     319 409            Imaging  Significant findings include:   Stat chest x-ray  No significant change  Cardiomegaly without marily pulmonary edema.      ECG/Telemetry  I have independently reviewed the telemetry. Significant findings include  .  Sinus bradycardia.  Low voltage.      POST-EVENT      Diagnosis: 1.)  Sinus pause with conversion to sinus bradycardia                      2.)  PAF                      3.)  Respiratory distress with hypoxia                      4.)  History of falls                      5.)  Chronic systolic and diastolic heart failure    Patient is hemodynamically stable.  Given ongoing bradycardia with heart rates currently at 50 will stop Cardizem and amiodarone infusions.  A.m. laboratory studies showed stable renal function with improved metabolic acidosis and hyperkalemia.  And troponin added for completeness however not felt to represent ACS  Chest x-ray without significant change hence we will  only treat with 40 mg of Lasix per her home dosing  Cardiology documentation suggests she is predominantly in sinus rhythm.  However if this continues to reoccur then need to consider additional therapeutics for sick sinus syndrome.    Patient condition at end of event:     Disposition: Okay to remain on telemetry    Critical Care Time: 45 minutes     KEY COMMUNICATION      Discussed with Attending (Daphney Chang DO) or Covering Attending (Harley Hernandez DO): Yes/No: yes    Discussed with Emergency Contact: Extended Emergency Contact Information  Primary Emergency Contact: Brendan Escalera   [place]  Mobile Phone: [phone]  Relation: None  Secondary Emergency Contact: Elder Escalera  Address: [address]  Home Phone: [phone]  Mobile Phone: [phone]  Relation: Son: Yes/No: no    Follow-Up/Handoff: Will send email hand over in AM

## 2023-05-06 NOTE — UM PHYSICIAN REVIEW NOTE
Outpatient status is appropriate for this 1MN stay for management of chest pain , a.fib & A.K.I.

## 2023-05-08 ENCOUNTER — TELEPHONE (OUTPATIENT)
Dept: PRIMARY CARE | Facility: CLINIC | Age: 88
End: 2023-05-08
Payer: MEDICARE

## 2023-05-08 ENCOUNTER — PATIENT OUTREACH (OUTPATIENT)
Dept: CASE MANAGEMENT | Facility: CLINIC | Age: 88
End: 2023-05-08
Payer: MEDICARE

## 2023-05-08 ASSESSMENT — ENCOUNTER SYMPTOMS
PSYCHIATRIC NEGATIVE: 1
CARDIOVASCULAR NEGATIVE: 1
SHORTNESS OF BREATH: 0
MUSCULOSKELETAL NEGATIVE: 1
ALLERGIC/IMMUNOLOGIC NEGATIVE: 1
PALPITATIONS: 0
CONSTITUTIONAL NEGATIVE: 1
CHEST TIGHTNESS: 0
BRUISES/BLEEDS EASILY: 1
GASTROINTESTINAL NEGATIVE: 1
EYES NEGATIVE: 1

## 2023-05-08 NOTE — PROGRESS NOTES
NAME: Gali Escalera    MRN: [id number]    YOB: 1929    Event Review:    Initial TCM Patient Outreach Date: 05/08/23    Assessment completed with: Patient  Patient stated reason for hospitalization: Chest tightness, SOB  Discharge Diagnosis: Mycocardial injury    Patient readmitted in the last 30 days: No  Discharging Facility: [facility]  Date of Last Admission: 05/04/23  Date of Last Discharge: 05/05/23    Patient's perception of their health status since discharge: Improving    HPI: Patient presented to [facility] via EMS d/t chest tightness and SOB.       On admission patient hypotensive 86/68 and with elevated -170. EKG showing Afib with RVR. Troponins 26 and 24. CXR showing minimal retrocardiac opacity which could represent atelectasis, aspiration vs pneumonia. PO Lasix given x1. She was started on IV Cardizem, amiodarone, heparin and IVF. She converted to NSR with mild bradycardia. ZANE noted, Diovan d/c. DAPT stopped and was started on PO Eliquis. Deemed stable for discharge home.    Spoke to patient who states that she is feeling good. She slept 12 hours last night. She denies chest pain. She is tolerating diet. She continues to be ambulatory independent. Her kids were over this weekend and arranged her med pill box to follow new changes. She plans to reach out to cardiology, Dr. Yeboah to book f/u.     Review of Systems   Constitutional: Negative.    HENT: Negative.    Eyes: Negative.    Respiratory: Negative for chest tightness and shortness of breath.    Cardiovascular: Negative.  Negative for palpitations.   Gastrointestinal: Negative.    Genitourinary: Negative.    Musculoskeletal: Negative.    Skin: Negative.    Allergic/Immunologic: Negative.    Hematological: Bruises/bleeds easily (on A/C).   Psychiatric/Behavioral: Negative.      Medication Review:    Medication Review: No  If No, please explain: Pt unable, Family not available  Reported by: Patient  Any new  medications prescribed at discharge?: Yes    New prescriptions filled?: Yes    Reconciled the current and discharge medications: No  Reviewed AVS (Discharge Instructions)?: Yes    Acute Pain:    Acute pain: No    Chronic Pain:    Chronic pain: No    Diet/Nutrition:    Type of Diet: Regular  Diet Adherence: Adherent with diet    Home Care Services:    Home Care Agency: [facility]  Type of Home Care Services: Home Nursing, Home PT  Home Care Interventions: No intervention required     Post-Discharge Durable Medical Equipment::    Durable Medical Equipment: Front wheeled walker, Grab bars, Blood pressure cuff (Patient independant with ADLs)    Home Management:    Living Arrangement: Alone  Support System:: Child/Children  Type of Residence: [address] [place] [address]  Home Monitoring: None  Any patient reported falls in the last 3 months?: No  Sikh or spiritual beliefs that impact treatment?: No    Appointment Scheduling:    PCP appointment scheduled: No    Patient Scheduling Dispositions: No availability on schedule/message sent to office to assist with scheduling     Follow-Ups:     Relevant Labs & Tests: Repeat BMP recommended in 4 days  Relevant Specialist Follow-ups: Cardiology f/u recommended, Dr. Yeboah (Patient will call to book)    Interventions/ Care Coordination:    Interventions/ Care Coordination: Provided resources to facilitate adherence, Encouraged patient to schedule with the PCP/Specialist, Addressed a knowledge deficit, Assisted patient in the scheduling of an appointment  General Education: Falls/Home safety, Bleeding Precautions while on ASA, DOAC, Injectable Blood Thinners    Reviewed signs/symptoms of worsening condition or complication that necessitate a call to the Physician's office.  Educated patient on access to care.  RN phone number given for future care management.    Marietta Sheets RN  [phone]

## 2023-05-08 NOTE — TELEPHONE ENCOUNTER
TCM needed. Can you please reach out to her to book this soon as it is recommended that she has f/u blood work in 4 days. Hospital details below:     Gali Dinero 5/4/23 - 5/5/23   DX: Myocardial injury      OK for Carolina or Dr Jett if Dr Archibald is full

## 2023-05-08 NOTE — UM PHYSICIAN REVIEW NOTE
Outpatient services are appropriate for this 89yo female presenting with CP related to afib.  Converted to NSR after Cardizem and Amio drips.  D/c'd after 1 MN stay.    Ben Goldstein MD

## 2023-05-09 LAB
BACTERIA BLD CULT: NORMAL
BACTERIA BLD CULT: NORMAL

## 2023-05-12 ENCOUNTER — APPOINTMENT (OUTPATIENT)
Dept: LAB | Age: 88
End: 2023-05-12
Attending: INTERNAL MEDICINE
Payer: MEDICARE

## 2023-05-12 ENCOUNTER — OFFICE VISIT (OUTPATIENT)
Dept: PRIMARY CARE | Facility: CLINIC | Age: 88
End: 2023-05-12
Payer: MEDICARE

## 2023-05-12 VITALS
RESPIRATION RATE: 14 BRPM | OXYGEN SATURATION: 94 % | SYSTOLIC BLOOD PRESSURE: 110 MMHG | TEMPERATURE: 97.3 F | DIASTOLIC BLOOD PRESSURE: 60 MMHG | HEART RATE: 54 BPM

## 2023-05-12 DIAGNOSIS — I48.0 PAROXYSMAL ATRIAL FIBRILLATION (CMS/HCC): Primary | ICD-10-CM

## 2023-05-12 DIAGNOSIS — I25.10 CORONARY ARTERY DISEASE INVOLVING NATIVE CORONARY ARTERY OF NATIVE HEART WITHOUT ANGINA PECTORIS: ICD-10-CM

## 2023-05-12 DIAGNOSIS — I48.0 PAROXYSMAL ATRIAL FIBRILLATION (CMS/HCC): ICD-10-CM

## 2023-05-12 DIAGNOSIS — N18.4 CHRONIC RENAL DISEASE, STAGE IV (CMS/HCC): ICD-10-CM

## 2023-05-12 DIAGNOSIS — R93.89 ABNORMAL CXR: ICD-10-CM

## 2023-05-12 DIAGNOSIS — E87.8 ELECTROLYTE IMBALANCE: ICD-10-CM

## 2023-05-12 DIAGNOSIS — Z95.820 S/P ANGIOPLASTY WITH STENT: ICD-10-CM

## 2023-05-12 PROBLEM — I70.211: Status: ACTIVE | Noted: 2020-06-08

## 2023-05-12 PROBLEM — I70.211: Status: RESOLVED | Noted: 2020-06-08 | Resolved: 2023-05-12

## 2023-05-12 PROBLEM — I73.9 RIGHT LEG CLAUDICATION (CMS/HCC): Status: RESOLVED | Noted: 2021-05-19 | Resolved: 2023-05-12

## 2023-05-12 PROBLEM — R19.7 DIARRHEA: Status: RESOLVED | Noted: 2019-11-22 | Resolved: 2023-05-12

## 2023-05-12 PROBLEM — I21.4 NON-ST ELEVATION MYOCARDIAL INFARCTION (NSTEMI) (CMS/HCC): Status: ACTIVE | Noted: 2022-05-25

## 2023-05-12 LAB
ALBUMIN SERPL-MCNC: 3 G/DL (ref 3.4–5)
ALP SERPL-CCNC: 80 IU/L (ref 35–126)
ALT SERPL-CCNC: 21 IU/L (ref 11–54)
ANION GAP SERPL CALC-SCNC: 9 MEQ/L (ref 3–15)
AST SERPL-CCNC: 17 IU/L (ref 15–41)
BASOPHILS # BLD: 0.04 K/UL (ref 0.01–0.1)
BASOPHILS NFR BLD: 0.4 %
BILIRUB SERPL-MCNC: 1.2 MG/DL (ref 0.3–1.2)
BUN SERPL-MCNC: 9 MG/DL (ref 8–20)
CALCIUM SERPL-MCNC: 8.9 MG/DL (ref 8.9–10.3)
CHLORIDE SERPL-SCNC: 109 MEQ/L (ref 98–109)
CO2 SERPL-SCNC: 26 MEQ/L (ref 22–32)
CREAT SERPL-MCNC: 1.1 MG/DL (ref 0.6–1.1)
DIFFERENTIAL METHOD BLD: ABNORMAL
EOSINOPHIL # BLD: 0.27 K/UL (ref 0.04–0.36)
EOSINOPHIL NFR BLD: 3 %
ERYTHROCYTE [DISTWIDTH] IN BLOOD BY AUTOMATED COUNT: 14.8 % (ref 11.7–14.4)
GFR SERPL CREATININE-BSD FRML MDRD: 46.4 ML/MIN/1.73M*2
GLUCOSE SERPL-MCNC: 77 MG/DL (ref 70–99)
HCT VFR BLDCO AUTO: 42.2 % (ref 35–45)
HGB BLD-MCNC: 13.2 G/DL (ref 11.8–15.7)
IMM GRANULOCYTES # BLD AUTO: 0.05 K/UL (ref 0–0.08)
IMM GRANULOCYTES NFR BLD AUTO: 0.5 %
LYMPHOCYTES # BLD: 1.26 K/UL (ref 1.2–3.5)
LYMPHOCYTES NFR BLD: 13.8 %
MCH RBC QN AUTO: 30.5 PG (ref 28–33.2)
MCHC RBC AUTO-ENTMCNC: 31.3 G/DL (ref 32.2–35.5)
MCV RBC AUTO: 97.5 FL (ref 83–98)
MONOCYTES # BLD: 0.82 K/UL (ref 0.28–0.8)
MONOCYTES NFR BLD: 9 %
NEUTROPHILS # BLD: 6.68 K/UL (ref 1.7–7)
NEUTS SEG NFR BLD: 73.3 %
NRBC BLD-RTO: 0 %
PDW BLD AUTO: 10.3 FL (ref 9.4–12.3)
PLATELET # BLD AUTO: 416 K/UL (ref 150–369)
POTASSIUM SERPL-SCNC: 3.9 MEQ/L (ref 3.6–5.1)
PROT SERPL-MCNC: 5.4 G/DL (ref 6–8.2)
RBC # BLD AUTO: 4.33 M/UL (ref 3.93–5.22)
SODIUM SERPL-SCNC: 144 MEQ/L (ref 136–144)
WBC # BLD AUTO: 9.12 K/UL (ref 3.8–10.5)

## 2023-05-12 PROCEDURE — 1111F DSCHRG MED/CURRENT MED MERGE: CPT | Performed by: INTERNAL MEDICINE

## 2023-05-12 PROCEDURE — 85025 COMPLETE CBC W/AUTO DIFF WBC: CPT

## 2023-05-12 PROCEDURE — 99495 TRANSJ CARE MGMT MOD F2F 14D: CPT | Performed by: INTERNAL MEDICINE

## 2023-05-12 PROCEDURE — 36415 COLL VENOUS BLD VENIPUNCTURE: CPT

## 2023-05-12 PROCEDURE — 80053 COMPREHEN METABOLIC PANEL: CPT

## 2023-05-12 RX ORDER — METOPROLOL SUCCINATE 100 MG/1
100 TABLET, EXTENDED RELEASE ORAL DAILY
COMMUNITY
Start: 2023-05-12

## 2023-05-12 RX ORDER — NITROGLYCERIN 0.4 MG/1
0.4 TABLET SUBLINGUAL EVERY 5 MIN PRN
Qty: 25 TABLET | Refills: 3 | Status: SHIPPED | OUTPATIENT
Start: 2023-05-12 | End: 2024-07-02

## 2023-05-12 ASSESSMENT — ENCOUNTER SYMPTOMS
BLOOD IN STOOL: 0
SLEEP DISTURBANCE: 0
SHORTNESS OF BREATH: 1
ARTHRALGIAS: 1
WEAKNESS: 1
TREMORS: 0
FATIGUE: 1
FEVER: 0
CONSTIPATION: 0
ACTIVITY CHANGE: 1
HEMATURIA: 0
DECREASED CONCENTRATION: 0
DIARRHEA: 0
NERVOUS/ANXIOUS: 0
HEADACHES: 0
ABDOMINAL PAIN: 0
PALPITATIONS: 0
APPETITE CHANGE: 0
DIZZINESS: 0
DYSURIA: 0

## 2023-05-12 NOTE — PROGRESS NOTES
[facility] IN [facility]       Reason for visit: Transitional Care Management (Out of Breath periodically.)    HPI:  Gali Escalera is a 89 y.o. female presenting for follow-up after hospital discharge.    Patient readmitted in the last 30 days: No    Discharge Diagnosis: Mycocardial injury  Discharging Facility: [facility]  Date of Last Admission: 05/04/23  Date of Last Discharge: 05/05/23      Relevant Specialist Follow-ups: Cardiology f/u recommended, Dr. Yeboah (Patient will call to book)    Initial TCM Patient Outreach Date: 05/08/23    Summary of Hospital Course  Patient presented with chest tightness and shortness of breath.  Found to be hypotensive on arrival of EMS.  In ER found to be tachycardic with rate of 160, blood pressure 86/68, O2 sat 97%.  Electrocardiogram with atrial fibrillation at a rate of 170.  BNP was not obtained.  Chest x-ray did not reveal congestive heart failure.   Blood pressure improved after IV hydration, treated with Cardizem infusion.  Potassium 5.7, creatinine 2.0, troponin 26 and 24 on repeat. Max troponin 26.3.   White count 14.8.  Chest x-ray showed retrocardiac opacification.  Amiodarone added to Cardizem and she reverted to normal sinus.    She was placed on Eliquis and discontinued aspirin and Plavix which she has been taking.  Because of renal dysfunction, valsartan was held.    Medications prescribed at discharge reconciled with current ambulatory medication list: Yes.  Medications include Eliquis 2.5 twice daily, atorvastatin 10 mg, furosemide 20 mg, Imdur 30 mg daily, metoprolol succinate 100 mg twice daily, nitroglycerin sublingual as needed.    Inpatient testing (labs, imaging, cardiovascular) requiring follow-up:    On discharge electrolytes normal, BUN 44, creatinine 2.0, GFR 23.3, glucose 114, AST 49, ALT 58.  Repeat chest x-ray with hazy opacification behind the heart.    History since hospital discharge:   Is very tired. Is  eating. No chest pain or palpitations. Mild SOB.  Mild leg edema. Ambulating without assistance. To get a cane.  BMs fine. No blood in stool. Voiding fine, no hematuria.   Has visiting RN and PT.     PMH:  +Staghorn calculus with mild left hydronephrosis November 2019.  +Paroxysmal atrial fibrillation November 2019 , hospitalized (converted on own)   + Hospitalized May 2022 with congestive heart failure, pneumonia and NSTE MI.  + Echocardiogram January 2023, left ventricular ejection fraction 55%, grade 1 diastolic dysfunction, suboptimal to assess wall motion.  + hyperlipidemia. controlled on Atorvastatin.  +leg edema., mild since on diltiazem  + non critical occlusion at proximal LAD,, ejection fraction 55% on cath 2014; was on Ranexa and Metoprolol. .(ranexa discontinued 2019) . Dr. Yeboah cardio  + peripheral vascular disease, angioplasty with stents to right SFA and right popliteal, 2020  +degenerative arthritis, in both knees...  +fracture left hip July 2017, due to a fall. Had left hip pinning , then replacement Dec 2017.   Advance Care Planning Documents     Document Type Status Effective Date Expiration Date Received On Description    Advance Directives and Living Will Not Received        Power of  Not Received              Patient Active Problem List   Diagnosis   • Coronary artery disease   • DJD (degenerative joint disease) of knee   • Mixed hyperlipidemia   • Renal calculi   • Paroxysmal atrial fibrillation (CMS/[facility])   • Staghorn calculus   • Myocardial injury   • Chronic systolic CHF (congestive heart failure) (CMS/[facility])   • Non-ST elevation myocardial infarction (NSTEMI) (CMS/[facility])   • S/P angioplasty with stent   • Chronic renal disease, stage IV (CMS/[facility])     Past Medical History:   Diagnosis Date   • Coronary artery disease     2014 cath showed nonobstructive CAD with 50% LAD stenosis, 30% RCA stenosis   • Hyperlipidemia    • Mitral valve disorder    • Paroxysmal atrial fibrillation  (CMS/HCC)    • Systolic CHF (CMS/HCC)     LVEF 45% in 5/2022. LVEF recovered to 55% 1/2023     Past Surgical History:   Procedure Laterality Date   • TOTAL HIP ARTHROPLASTY       Social History     Socioeconomic History   • Marital status:      Spouse name: Not on file   • Number of children: Not on file   • Years of education: Not on file   • Highest education level: Not on file   Occupational History   • Not on file   Tobacco Use   • Smoking status: Former     Types: Cigarettes   • Smokeless tobacco: Never   Vaping Use   • Vaping status: Never Used   Substance and Sexual Activity   • Alcohol use: Not on file   • Drug use: Not on file   • Sexual activity: Not on file   Other Topics Concern   • Not on file   Social History Narrative   • Not on file     Social Determinants of Health     Financial Resource Strain: Low Risk  (5/5/2023)    Overall Financial Resource Strain (CARDIA)    • Difficulty of Paying Living Expenses: Not hard at all   Food Insecurity: No Food Insecurity (5/4/2023)    Hunger Vital Sign    • Worried About Running Out of Food in the Last Year: Never true    • Ran Out of Food in the Last Year: Never true   Transportation Needs: No Transportation Needs (5/5/2023)    PRAPARE - Transportation    • Lack of Transportation (Medical): No    • Lack of Transportation (Non-Medical): No   Physical Activity: Not on file   Stress: Not on file   Social Connections: Not on file   Intimate Partner Violence: Not on file   Housing Stability: Low Risk  (5/5/2023)    Housing Stability Vital Sign    • Unable to Pay for Housing in the Last Year: No    • Number of Places Lived in the Last Year: 1    • Unstable Housing in the Last Year: No     Family History   Problem Relation Age of Onset   • Breast cancer Biological Mother      No known allergies  Current Outpatient Medications   Medication Sig Dispense Refill   • acetaminophen (TYLENOL) 500 mg tablet Take 500 mg by mouth every 4 (four) hours as needed for mild  pain or headaches.     • apixaban (ELIQUIS) 2.5 mg tablet Take 1 tablet (2.5 mg total) by mouth 2 (two) times a day Indications: treatment to prevent blood clots in chronic atrial fibrillation. 60 tablet 0   • atorvastatin (LIPITOR) 10 mg tablet Take 10 mg by mouth daily.     • furosemide (LASIX) 20 mg tablet Take 20 mg by mouth daily.     • isosorbide mononitrate (IMDUR) 30 mg 24 hr tablet Take 30 mg by mouth daily.     • metoprolol succinate XL (TOPROL-XL) 100 mg 24 hr tablet Take 1 tablet (100 mg total) by mouth daily.     • nitroglycerin (NITROSTAT) 0.4 mg SL tablet Place 1 tablet (0.4 mg total) under the tongue every 5 (five) minutes as needed for chest pain. 25 tablet 3     No current facility-administered medications for this visit.       ROS:  Review of Systems   Constitutional: Positive for activity change (lower ) and fatigue. Negative for appetite change and fever.   Eyes: Negative for visual disturbance.   Respiratory: Positive for shortness of breath.    Cardiovascular: Positive for leg swelling. Negative for chest pain and palpitations.   Gastrointestinal: Negative for abdominal pain, blood in stool, constipation and diarrhea.   Genitourinary: Negative for dysuria and hematuria.   Musculoskeletal: Positive for arthralgias and gait problem.   Neurological: Positive for weakness. Negative for dizziness, tremors, syncope and headaches.   Psychiatric/Behavioral: Negative for decreased concentration and sleep disturbance. The patient is not nervous/anxious.        Vitals:    05/12/23 1259 05/12/23 1351   BP: 90/60 110/60   BP Location: Right upper arm Right upper arm   Patient Position: Sitting Sitting   Pulse: 69 (!) 54   Resp: 14    Temp: 36.3 °C (97.3 °F)    TempSrc: Temporal    SpO2: 94%        EXAM:  Physical Exam  Vitals and nursing note reviewed.   Constitutional:       General: She is not in acute distress.     Comments: Appears tired and weak  Has full cognitive abilities.    HENT:      Head:  Normocephalic.   Cardiovascular:      Rate and Rhythm: Bradycardia present.      Pulses:           Carotid pulses are 2+ on the right side and 2+ on the left side.     Heart sounds: Normal heart sounds.      Comments: Rate 50's  Pulmonary:      Effort: Pulmonary effort is normal. No respiratory distress.      Breath sounds: No stridor. No wheezing.      Comments: Decreased BS bases   Abdominal:      General: There is no distension.      Tenderness: There is no abdominal tenderness.   Musculoskeletal:      Cervical back: No tenderness.      Right lower le+ Pitting Edema present.      Left lower le+ Pitting Edema present.   Skin:     Comments: Ecchymosis arms from blood draw    Neurological:      Mental Status: She is oriented to person, place, and time. Mental status is at baseline.      Cranial Nerves: No cranial nerve deficit.      Motor: Weakness (mild of lower ext ) present.      Gait: Gait abnormal.   Psychiatric:         Mood and Affect: Mood normal.         Behavior: Behavior normal.         Thought Content: Thought content normal.         Procedures    Lab Results   Component Value Date    WBC 12.77 (H) 2023    HGB 12.0 2023    HCT 38.4 2023     2023    CHOL 217 (H) 2020    TRIG 183 (H) 2020    HDL 51 (L) 2020    ALT 58 (H) 2023    AST 49 (H) 2023     2023    K 5.1 2023     2023    CREATININE 2.0 (H) 2023    BUN 44 (H) 2023    CO2 22 2023    TSH 3.64 2023    INR 1.1 2023         ASSESSMENT/PLAN:  Diagnoses and all orders for this visit:    Paroxysmal atrial fibrillation (CMS/HCC) (Primary)  -     Comprehensive metabolic panel; Future  -     CBC and Differential; Future    Coronary artery disease involving native coronary artery of native heart without angina pectoris  -     Comprehensive metabolic panel; Future  -     CBC and Differential; Future  -     nitroglycerin (NITROSTAT) 0.4  mg SL tablet; Place 1 tablet (0.4 mg total) under the tongue every 5 (five) minutes as needed for chest pain.    Abnormal CXR    Electrolyte imbalance  -     Comprehensive metabolic panel; Future  -     CBC and Differential; Future    Chronic renal disease, stage IV (CMS/HCC)    S/P angioplasty with stent      -Hospitalized with rapid atrial fibrillation, hypotension, dehydration.  Converted with Cardizem and amiodarone.  Troponins minimally elevated with maximal at 26.3.  Hypotension treated with IV fluids.  Valsartan discontinued due to elevated creatinine of 2.0 with GFR of 23.3.  -Discharged on Eliquis 2.5 twice daily, atorvastatin 10 mg, furosemide 20 mg, Imdur 30 mg, metoprolol succinate 100 mg twice daily.  -Currently patient's BP is low, initially 90/60 and repeat 110/60.  She is bradycardic.  She is tired. no evidence of CHF on exam.  Does have some peripheral edema.  Decrease metoprolol succinate 100 mg daily.  Visiting nurse is coming tomorrow and next week.  Patient to return here next week to reassess.  -Electrolyte imbalance, prerenal azotemia, elevated LFTs, on labs in hospital.  Repeat these labs.  -Chest x-ray with hazy opacification behind the heart.  Echo was not done during this hospitalization.  Echo or CT of chest may be warranted to further evaluate this.  Patient to visit with her cardiologist.  -Above discussed, questions answered, return next week.  To see cardiologist.  Timmy Archibald MD  5/12/2023

## 2023-05-12 NOTE — PATIENT INSTRUCTIONS
"You had low blood pressure, rapid atrial fibrillation, dehydration  This caused mild heart injury but not a true heart attack.   Blood tests today.     Chest xray showed some abnormality behind the heart. A 'hazy opacity\"  Heart ECHO will tell us what it is.     Blood pressure today is a bit low and heart rate is low at 54.   You are on Metoprolol  XR 24 hr tab 100mg 2 x a day .  Cut back to 100mg once a day in the morning.   Have home nurse check vital signs.   Have her report to us.     Return one week     "

## 2023-05-15 ENCOUNTER — TELEPHONE (OUTPATIENT)
Dept: PRIMARY CARE | Facility: CLINIC | Age: 88
End: 2023-05-15
Payer: MEDICARE

## 2023-05-16 ENCOUNTER — PATIENT OUTREACH (OUTPATIENT)
Dept: CASE MANAGEMENT | Facility: CLINIC | Age: 88
End: 2023-05-16
Payer: MEDICARE

## 2023-05-16 NOTE — PROGRESS NOTES
SILVER follow up outreach made to patient. She states that she is doing well, denies further chest tightness. She met with PCP 5/12/23 and will see her again Thursday 5/18/23. Denies any current needs.    Care management available for future needs.    Marietta Sheets, RN  Nurse Ambulatory Care Manager  [phone]

## 2023-05-18 ENCOUNTER — OFFICE VISIT (OUTPATIENT)
Dept: PRIMARY CARE | Facility: CLINIC | Age: 88
End: 2023-05-18
Payer: MEDICARE

## 2023-05-18 ENCOUNTER — TELEPHONE (OUTPATIENT)
Dept: PRIMARY CARE | Facility: CLINIC | Age: 88
End: 2023-05-18

## 2023-05-18 VITALS
HEART RATE: 62 BPM | TEMPERATURE: 96.1 F | SYSTOLIC BLOOD PRESSURE: 132 MMHG | DIASTOLIC BLOOD PRESSURE: 60 MMHG | OXYGEN SATURATION: 94 % | RESPIRATION RATE: 14 BRPM

## 2023-05-18 DIAGNOSIS — E87.8 ELECTROLYTE IMBALANCE: ICD-10-CM

## 2023-05-18 DIAGNOSIS — I48.0 PAROXYSMAL ATRIAL FIBRILLATION (CMS/HCC): Primary | ICD-10-CM

## 2023-05-18 DIAGNOSIS — I95.2 HYPOTENSION DUE TO DRUGS: ICD-10-CM

## 2023-05-18 PROCEDURE — 99213 OFFICE O/P EST LOW 20 MIN: CPT | Performed by: INTERNAL MEDICINE

## 2023-05-18 ASSESSMENT — ENCOUNTER SYMPTOMS
DYSURIA: 0
PALPITATIONS: 0
NERVOUS/ANXIOUS: 0
DYSPHORIC MOOD: 0
SLEEP DISTURBANCE: 0
APPETITE CHANGE: 1
GASTROINTESTINAL NEGATIVE: 1
DECREASED CONCENTRATION: 0
WEAKNESS: 0
SHORTNESS OF BREATH: 0
ARTHRALGIAS: 1
ACTIVITY CHANGE: 1

## 2023-05-18 NOTE — TELEPHONE ENCOUNTER
Grey from [facility] left  today at 3:01 pm.  Gali had an appointment with their office today and she informed them that Dr. Archibald made changes to her medications but she couldn't verify what the changes were.  Please call Grey back at [phone].

## 2023-05-18 NOTE — PROGRESS NOTES
Subjective      Patient ID: Gali Escalera is a 89 y.o. female.    HPI  Here for one week follow up   Is feeling much better.   Leg edema is normal   Appetite is fine.   No chest pain , palpitations.   SOB has improved.   Has visiting RN and PT has completed. Pt doing exercises at home.   Not needing assistant device for ambulation.   Sleeping fine.     She was hospitalized May 4, 2023 with atrial fibrillation with rapid ventricular rate of 170, hypotensive.  Treated with fluids and Cardizem infusion.  Converted to normal sinus with amiodarone.  On Eliquis.  Had prerenal azotemia therefore valsartan discontinued.    When patient was seen last week she was hypotensive with BP of 90/60 and bradycardic.  She felt very tired and weak with some shortness of breath.  No sign of CHF on exam.  Metoprolol succinate had been dosed at 100 mg twice daily.  This decreased to 100 mg daily.  She is feeling much better today.    Reviewed recent labs, creatinine normalized to 1.1, GFR drastically improved to 46.  The following have been reviewed and updated as appropriate in this visit:   Allergies  Meds  Problems       Review of Systems   Constitutional: Positive for activity change (improved ) and appetite change (improved).   Respiratory: Negative for shortness of breath.    Cardiovascular: Positive for leg swelling. Negative for chest pain and palpitations.   Gastrointestinal: Negative.    Genitourinary: Negative for dysuria.   Musculoskeletal: Positive for arthralgias and gait problem (mild, improved).   Neurological: Negative for weakness.   Psychiatric/Behavioral: Negative for decreased concentration, dysphoric mood and sleep disturbance. The patient is not nervous/anxious.      Current Outpatient Medications   Medication Sig Dispense Refill   • acetaminophen (TYLENOL) 500 mg tablet Take 500 mg by mouth every 4 (four) hours as needed for mild pain or headaches.     • apixaban (ELIQUIS) 2.5 mg tablet Take 1 tablet (2.5 mg  total) by mouth 2 (two) times a day Indications: treatment to prevent blood clots in chronic atrial fibrillation. 60 tablet 0   • atorvastatin (LIPITOR) 10 mg tablet Take 10 mg by mouth daily.     • furosemide (LASIX) 20 mg tablet Take 20 mg by mouth daily.     • isosorbide mononitrate (IMDUR) 30 mg 24 hr tablet Take 30 mg by mouth daily.     • metoprolol succinate XL (TOPROL-XL) 100 mg 24 hr tablet Take 1 tablet (100 mg total) by mouth daily.     • nitroglycerin (NITROSTAT) 0.4 mg SL tablet Place 1 tablet (0.4 mg total) under the tongue every 5 (five) minutes as needed for chest pain. 25 tablet 3     No current facility-administered medications for this visit.     Past Medical History:   Diagnosis Date   • Coronary artery disease     2014 cath showed nonobstructive CAD with 50% LAD stenosis, 30% RCA stenosis   • Hyperlipidemia    • Mitral valve disorder    • Paroxysmal atrial fibrillation (CMS/HCC)    • Systolic CHF (CMS/HCC)     LVEF 45% in 5/2022. LVEF recovered to 55% 1/2023     Family History   Problem Relation Age of Onset   • Breast cancer Biological Mother      Allergies   Allergen Reactions   • No Known Allergies      Past Surgical History:   Procedure Laterality Date   • TOTAL HIP ARTHROPLASTY       Social History     Socioeconomic History   • Marital status:      Spouse name: None   • Number of children: None   • Years of education: None   • Highest education level: None   Tobacco Use   • Smoking status: Former     Types: Cigarettes   • Smokeless tobacco: Never   Vaping Use   • Vaping status: Never Used     Social Determinants of Health     Financial Resource Strain: Low Risk  (5/5/2023)    Overall Financial Resource Strain (CARDIA)    • Difficulty of Paying Living Expenses: Not hard at all   Food Insecurity: No Food Insecurity (5/4/2023)    Hunger Vital Sign    • Worried About Running Out of Food in the Last Year: Never true    • Ran Out of Food in the Last Year: Never true   Transportation Needs:  No Transportation Needs (2023)    PRAPARE - Transportation    • Lack of Transportation (Medical): No    • Lack of Transportation (Non-Medical): No   Housing Stability: Low Risk  (2023)    Housing Stability Vital Sign    • Unable to Pay for Housing in the Last Year: No    • Number of Places Lived in the Last Year: 1    • Unstable Housing in the Last Year: No       Objective     Vitals:   Vitals:    23 0911   BP: 132/60   BP Location: Right upper arm   Patient Position: Sitting   Pulse: 62   Resp: 14   Temp: (!) 35.6 °C (96.1 °F)   TempSrc: Temporal   SpO2: 94%       Physical Exam  Vitals and nursing note reviewed.   Constitutional:       General: She is not in acute distress.     Appearance: She is not ill-appearing.      Comments: Much improved.    Cardiovascular:      Rate and Rhythm: Normal rate and regular rhythm.      Heart sounds: Normal heart sounds.   Pulmonary:      Effort: Pulmonary effort is normal.      Breath sounds: Normal breath sounds. No rales.   Musculoskeletal:      Right lower le+ Edema present.      Left lower le+ Edema present.   Neurological:      Mental Status: She is oriented to person, place, and time.      Cranial Nerves: No cranial nerve deficit.      Motor: Weakness (mild) present.   Psychiatric:         Mood and Affect: Mood normal.         Behavior: Behavior normal.         Labs  Lab Results   Component Value Date    WBC 9.12 2023    HGB 13.2 2023    HCT 42.2 2023     (H) 2023    CHOL 217 (H) 2020    TRIG 183 (H) 2020    HDL 51 (L) 2020    LDLCALC 129 (H) 2020    ALT 21 2023    AST 17 2023     2023    K 3.9 2023    GLUCOSE 77 2023     2023    CREATININE 1.1 2023    BUN 9 2023    CO2 26 2023    TSH 3.64 2023    EGFR 46.4 (L) 2023       Assessment/Plan   Problem List Items Addressed This Visit        Circulatory    Paroxysmal atrial  fibrillation (CMS/HCC) - Primary   Other Visit Diagnoses     Electrolyte imbalance        Hypotension due to drugs            -Hypotension and bradycardia improved with change in metoprolol.  Currently on metoprolol succinate 100 mg daily instead of twice daily.  Remains with peripheral edema.  On furosemide.  -Electrolyte imbalance and renal function improved, reviewed recent labs.  -Paroxysmal atrial fibrillation.  In regular rhythm on exam.  On Eliquis.  Has appointment with cardiologist later today.  -Above discussed, labs reviewed, medications reviewed, all questions answered, overall she is doing well, return in 2 months or before if needed.  Timmy Archibald MD  5/18/2023

## 2023-05-18 NOTE — PATIENT INSTRUCTIONS
You have improved.   Continue current meds.   To see cardiologist.   Recent blood tests improved.     Return in July

## 2023-06-02 ENCOUNTER — DOCUMENTATION (OUTPATIENT)
Dept: PRIMARY CARE | Facility: CLINIC | Age: 88
End: 2023-06-02
Payer: MEDICARE

## 2023-07-20 ENCOUNTER — OFFICE VISIT (OUTPATIENT)
Dept: PRIMARY CARE | Facility: CLINIC | Age: 88
End: 2023-07-20
Payer: MEDICARE

## 2023-07-20 VITALS
SYSTOLIC BLOOD PRESSURE: 124 MMHG | TEMPERATURE: 97.3 F | RESPIRATION RATE: 14 BRPM | WEIGHT: 158.6 LBS | HEIGHT: 61 IN | BODY MASS INDEX: 29.94 KG/M2 | HEART RATE: 82 BPM | OXYGEN SATURATION: 96 % | DIASTOLIC BLOOD PRESSURE: 70 MMHG

## 2023-07-20 DIAGNOSIS — I48.0 PAROXYSMAL ATRIAL FIBRILLATION (CMS/HCC): Primary | ICD-10-CM

## 2023-07-20 DIAGNOSIS — Z95.820 S/P ANGIOPLASTY WITH STENT: ICD-10-CM

## 2023-07-20 DIAGNOSIS — I73.9 PAD (PERIPHERAL ARTERY DISEASE) (CMS/HCC): ICD-10-CM

## 2023-07-20 DIAGNOSIS — I25.10 CORONARY ARTERY DISEASE INVOLVING NATIVE CORONARY ARTERY OF NATIVE HEART WITHOUT ANGINA PECTORIS: ICD-10-CM

## 2023-07-20 PROCEDURE — 99213 OFFICE O/P EST LOW 20 MIN: CPT | Performed by: INTERNAL MEDICINE

## 2023-07-20 RX ORDER — CLOPIDOGREL BISULFATE 75 MG/1
75 TABLET ORAL DAILY
COMMUNITY
Start: 2023-06-11

## 2023-07-20 RX ORDER — VALSARTAN 40 MG/1
40 TABLET ORAL DAILY
COMMUNITY
Start: 2023-06-11

## 2023-07-20 ASSESSMENT — PAIN SCALES - GENERAL: PAINLEVEL: 0-NO PAIN

## 2023-07-20 ASSESSMENT — ENCOUNTER SYMPTOMS
COUGH: 0
PALPITATIONS: 0
SLEEP DISTURBANCE: 0
ACTIVITY CHANGE: 0
HEMATURIA: 0
BLOOD IN STOOL: 0
WEAKNESS: 1
DYSURIA: 0
DYSPHORIC MOOD: 0
APPETITE CHANGE: 0
NERVOUS/ANXIOUS: 0
FEVER: 0
SHORTNESS OF BREATH: 0
CONSTIPATION: 0
TREMORS: 0
HEADACHES: 0
FATIGUE: 1
ARTHRALGIAS: 1
ABDOMINAL PAIN: 0
DECREASED CONCENTRATION: 0
DIARRHEA: 0

## 2023-07-20 NOTE — PATIENT INSTRUCTIONS
You are doing fine  Let us know if any meds have changed.     Blood tests due in late August.   Look up compression stockings with zippers.     To cardiologist in the Fall.

## 2023-07-20 NOTE — PROGRESS NOTES
Subjective      Patient ID: Gali Escalera is a 89 y.o. female.    HPI  Here for check of medical conditions.   Saw Cardiologist Dr Roth this week. Made adjustments to meds but not sure which ones. She will call office with update.    Overall is feeling better. Is ambulating, but uses wheel chair when out. Leg edema is improved.  No chest pain or palpitations. Has chronic LIMON.     Saw orthopedics recently for left knee pain. Applied some type of tape to joint that helped relieve the pain. Joint feels better.     +hospitalized May 2023, rapid afib with hypotension and pulm edema. Converted with cardizem an amiodarone. troponins minimally elevated. Creat increase to 2.0 with GFR 23.3, so ACE held. Placed on Eliquis.   +Staghorn calculus with mild left hydronephrosis November 2019.  +Paroxysmal atrial fibrillation November 2019 , hospitalized (converted on own)   + Hospitalized May 2022 with congestive heart failure, pneumonia and NSTE MI.  + Echocardiogram January 2023, left ventricular ejection fraction 55%, grade 1 diastolic dysfunction, suboptimal to assess wall motion.  + hyperlipidemia. controlled on Atorvastatin.  +leg edema., mild since on diltiazem  + non critical occlusion at proximal LAD,, ejection fraction 55% on cath 2014; was on Ranexa and Metoprolol. .(ranexa discontinued 2019) . Dr. Yeboah cardio  + peripheral vascular disease, angioplasty with stents to right SFA and right popliteal, 2020  +degenerative arthritis, in both knees...  +fracture left hip July 2017, due to mechanical fall. Had left hip pinning , then replacement Dec 2017.                     The following have been reviewed and updated as appropriate in this visit:   Allergies  Meds  Problems       Review of Systems   Constitutional: Positive for fatigue (mild). Negative for activity change, appetite change and fever.   Respiratory: Negative for cough and shortness of breath.    Cardiovascular: Negative for chest pain and  palpitations.   Gastrointestinal: Negative for abdominal pain, blood in stool, constipation and diarrhea.   Genitourinary: Negative for dysuria and hematuria.   Musculoskeletal: Positive for arthralgias and gait problem.   Neurological: Positive for weakness (tires with ambulation ). Negative for tremors, syncope and headaches.   Psychiatric/Behavioral: Negative for decreased concentration, dysphoric mood and sleep disturbance. The patient is not nervous/anxious.      Current Outpatient Medications   Medication Sig Dispense Refill   • acetaminophen (TYLENOL) 500 mg tablet Take 500 mg by mouth every 4 (four) hours as needed for mild pain or headaches.     • atorvastatin (LIPITOR) 10 mg tablet Take 10 mg by mouth daily.     • ELIQUIS 2.5 mg tablet TAKE 1 TABLET BY MOUTH 2 TIMES DAILY (TREATMENT TO PREVENT BLOOD CLOTS) 60 tablet 2   • furosemide (LASIX) 20 mg tablet Take 20 mg by mouth daily.     • isosorbide mononitrate (IMDUR) 30 mg 24 hr tablet Take 30 mg by mouth daily.     • metoprolol succinate XL (TOPROL-XL) 100 mg 24 hr tablet Take 1 tablet (100 mg total) by mouth daily.     • nitroglycerin (NITROSTAT) 0.4 mg SL tablet Place 1 tablet (0.4 mg total) under the tongue every 5 (five) minutes as needed for chest pain. 25 tablet 3   • valsartan (DIOVAN) 40 mg tablet Take 40 mg by mouth daily.     • clopidogreL (PLAVIX) 75 mg tablet Take 75 mg by mouth daily.       No current facility-administered medications for this visit.     Past Medical History:   Diagnosis Date   • Coronary artery disease     2014 cath showed nonobstructive CAD with 50% LAD stenosis, 30% RCA stenosis   • Hyperlipidemia    • Mitral valve disorder    • Paroxysmal atrial fibrillation (CMS/HCC)    • Systolic CHF (CMS/HCC)     LVEF 45% in 5/2022. LVEF recovered to 55% 1/2023     Family History   Problem Relation Age of Onset   • Breast cancer Biological Mother      Allergies   Allergen Reactions   • No Known Allergies      Past Surgical History:  "  Procedure Laterality Date   • TOTAL HIP ARTHROPLASTY       Social History     Socioeconomic History   • Marital status:      Spouse name: None   • Number of children: None   • Years of education: None   • Highest education level: None   Tobacco Use   • Smoking status: Former     Types: Cigarettes   • Smokeless tobacco: Never   Vaping Use   • Vaping Use: Never used     Social Determinants of Health     Financial Resource Strain: Low Risk  (5/5/2023)    Overall Financial Resource Strain (CARDIA)    • Difficulty of Paying Living Expenses: Not hard at all   Food Insecurity: No Food Insecurity (5/4/2023)    Hunger Vital Sign    • Worried About Running Out of Food in the Last Year: Never true    • Ran Out of Food in the Last Year: Never true   Transportation Needs: No Transportation Needs (5/5/2023)    PRAPARE - Transportation    • Lack of Transportation (Medical): No    • Lack of Transportation (Non-Medical): No   Housing Stability: Low Risk  (5/5/2023)    Housing Stability Vital Sign    • Unable to Pay for Housing in the Last Year: No    • Number of Places Lived in the Last Year: 1    • Unstable Housing in the Last Year: No       Objective     Vitals:   Vitals:    07/20/23 1116   BP: 124/70   BP Location: Right upper arm   Patient Position: Sitting   Pulse: 82   Resp: 14   Temp: 36.3 °C (97.3 °F)   TempSrc: Temporal   SpO2: 96%   Weight: 71.9 kg (158 lb 9.6 oz)   Height: 1.555 m (5' 1.22\")       Physical Exam  Vitals and nursing note reviewed.   Constitutional:       General: She is not in acute distress.     Appearance: Normal appearance.      Comments: Appears well    Cardiovascular:      Rate and Rhythm: Normal rate and regular rhythm.      Heart sounds: Normal heart sounds.   Pulmonary:      Effort: Pulmonary effort is normal. No respiratory distress.      Breath sounds: Normal breath sounds. No rales.   Abdominal:      General: Bowel sounds are normal.      Tenderness: There is no abdominal tenderness. "   Musculoskeletal:      Right lower le+ Edema present.      Left lower le+ Edema present.   Neurological:      Mental Status: She is oriented to person, place, and time.      Cranial Nerves: No cranial nerve deficit.      Motor: Weakness (mild , lower ext ) present.   Psychiatric:         Mood and Affect: Mood normal.         Behavior: Behavior normal.         Thought Content: Thought content normal.         Labs  Lab Results   Component Value Date    WBC 9.12 2023    HGB 13.2 2023    HCT 42.2 2023     (H) 2023    CHOL 217 (H) 2020    TRIG 183 (H) 2020    HDL 51 (L) 2020    LDLCALC 129 (H) 2020    ALT 21 2023    AST 17 2023     2023    K 3.9 2023    GLUCOSE 77 2023     2023    CREATININE 1.1 2023    BUN 9 2023    CO2 26 2023    TSH 3.64 2023    EGFR 46.4 (L) 2023       Assessment/Plan   Problem List Items Addressed This Visit        Circulatory    Coronary artery disease    Relevant Medications    Valsartan, imdur, lipitor, metoprolol.        Other Relevant Orders    Comprehensive metabolic panel    CBC and Differential    Paroxysmal atrial fibrillation (CMS/HCC) - Primary    Relevant Medications    Eliquis, lasix,         Other Relevant Orders    Comprehensive metabolic panel    CBC and Differential   Peripheral artery disease. Sent RT SFA and popliteal . On Plavix.     -overall pt is improving.   -HTN controlled.   -PAF. In reg rhythm. On Eliquis.   -ck labs Aug and RTO October.   -to ER if palpitations, SOB, chest pain.     Timmy Archibald MD  2023

## 2023-12-26 ENCOUNTER — TELEPHONE (OUTPATIENT)
Dept: PRIMARY CARE | Facility: CLINIC | Age: 88
End: 2023-12-26
Payer: MEDICARE

## 2023-12-27 ENCOUNTER — DOCUMENTATION (OUTPATIENT)
Dept: PRIMARY CARE | Facility: CLINIC | Age: 88
End: 2023-12-27
Payer: MEDICARE

## 2024-05-06 ENCOUNTER — APPOINTMENT (OUTPATIENT)
Dept: RADIOLOGY | Age: 89
End: 2024-05-06
Attending: FAMILY MEDICINE
Payer: MEDICARE

## 2024-05-06 ENCOUNTER — HOSPITAL ENCOUNTER (OUTPATIENT)
Facility: CLINIC | Age: 89
Discharge: HOME | End: 2024-05-06
Attending: FAMILY MEDICINE
Payer: MEDICARE

## 2024-05-06 VITALS
SYSTOLIC BLOOD PRESSURE: 124 MMHG | DIASTOLIC BLOOD PRESSURE: 80 MMHG | OXYGEN SATURATION: 93 % | TEMPERATURE: 97.2 F | HEART RATE: 95 BPM

## 2024-05-06 DIAGNOSIS — S30.0XXA CONTUSION OF LEFT BUTTOCK: Primary | ICD-10-CM

## 2024-05-06 PROCEDURE — 99214 OFFICE O/P EST MOD 30 MIN: CPT | Performed by: FAMILY MEDICINE

## 2024-05-06 PROCEDURE — 73502 X-RAY EXAM HIP UNI 2-3 VIEWS: CPT | Performed by: FAMILY MEDICINE

## 2024-05-06 ASSESSMENT — ENCOUNTER SYMPTOMS
WOUND: 0
DIZZINESS: 0
HEADACHES: 0
BRUISES/BLEEDS EASILY: 1
LIGHT-HEADEDNESS: 0

## 2024-05-06 NOTE — ED PROVIDER NOTES
History  Chief Complaint   Patient presents with    Fall     Fell directly on buttocks, feels like backside is swollen     Buttock injury     Patient was at a restaurant a week ago with her family members.  She fell and landed on her buttock.  She does take eliquis for atrial fibrillation.  She denies any head trauma.  She is swelling of her left buttock.  She denies any tenderness.      Trauma  Mechanism of injury: Fall  Time since incident: 7 days  Arrived directly from scene: no     Fall:       Fall occurred: walking       Impact surface: concrete       Point of impact: buttocks    Current symptoms:       Associated symptoms:             Denies headache.       Past Medical History:   Diagnosis Date    Coronary artery disease     2014 cath showed nonobstructive CAD with 50% LAD stenosis, 30% RCA stenosis    Hyperlipidemia     Mitral valve disorder     Paroxysmal atrial fibrillation (CMS/HCC)     Systolic CHF (CMS/HCC)     LVEF 45% in 5/2022. LVEF recovered to 55% 1/2023       Past Surgical History:   Procedure Laterality Date    TOTAL HIP ARTHROPLASTY         Family History   Problem Relation Age of Onset    Breast cancer Biological Mother        Social History     Tobacco Use    Smoking status: Former     Types: Cigarettes    Smokeless tobacco: Never   Vaping Use    Vaping Use: Never used       Review of Systems   Skin:  Negative for wound.   Neurological:  Negative for dizziness, light-headedness and headaches.   Hematological:  Bruises/bleeds easily.       Physical Exam  ED Triage Vitals [05/06/24 1606]   Temp Heart Rate Resp BP SpO2   36.2 °C (97.2 °F) 95 -- 124/80 93 %      Temp src Heart Rate Source Patient Position BP Location FiO2 (%) (Set)   -- -- Sitting Right upper arm --       Physical Exam  Vitals reviewed.   Constitutional:       Appearance: Normal appearance.   Cardiovascular:      Rate and Rhythm: Rhythm regularly irregular.   Pulmonary:      Effort: Pulmonary effort is normal. No respiratory  distress.      Breath sounds: Normal breath sounds.   Musculoskeletal:      Thoracic back: Normal.      Lumbar back: Normal.      Left hip: Tenderness present. Normal range of motion.      Left upper leg: Normal.      Right lower leg: No edema.      Left lower leg: No edema.        Legs:       Comments: Large hematoma left buttock, non-tender  No warmth or erythema   Neurological:      General: No focal deficit present.      Mental Status: She is alert and oriented to person, place, and time.           Procedures  Procedures    UC Course   Contusion left buttock    Medical Decision Making  Pelvic x-rays show no fracture  Hip x-ray shows intact hardware from replacement  Tylenol 500 mg every 8 hours as needed for pain  Follow-up with primary care physician                 Chelsie Marrufo DO  05/06/24 6061

## 2024-05-06 NOTE — DISCHARGE INSTRUCTIONS
Pelvic x-rays show no fracture  Hip x-ray shows intact hardware from replacement  Tylenol 500 mg every 8 hours as needed for pain  Follow-up with primary care physician

## 2024-06-07 ENCOUNTER — TELEPHONE (OUTPATIENT)
Dept: PRIMARY CARE | Facility: CLINIC | Age: 89
End: 2024-06-07
Payer: MEDICARE

## 2024-06-07 NOTE — TELEPHONE ENCOUNTER
Patient recently discharged from [facility] and [facility].  Genna from [facility] home called office to see if a provider would be able to sign home care orders.    Patient has seen anyone since July 2023 with Dr. Archibald. I advised HFU may be needed. I did reach out to patient and son Brendan to call office back and select option 4.

## 2024-06-07 NOTE — TELEPHONE ENCOUNTER
Spoke to Dr. Terrell. He WILL sign re cert for homecare, however, patient needs to schedule appt as well.

## 2024-06-12 NOTE — TELEPHONE ENCOUNTER
[facility] RN called office to see if pt has est care with any other provider, states pt only recalls PCP being Dr. Archibald who has since retired.   Informed RN that pt will need to call office to make SILVER appt.   RN understands and will inform pt.

## 2024-07-16 NOTE — ASSESSMENT & PLAN NOTE
- History of CAD s/p stent  - NSTEMI in 2022 on medical management with aspirin and plavix  - Holding aspirin and plavix per cardiology   Cardioversion for atrial fibrillation. No questions.  Family present.